# Patient Record
Sex: MALE | Race: BLACK OR AFRICAN AMERICAN | Employment: UNEMPLOYED | URBAN - METROPOLITAN AREA
[De-identification: names, ages, dates, MRNs, and addresses within clinical notes are randomized per-mention and may not be internally consistent; named-entity substitution may affect disease eponyms.]

---

## 2019-05-08 ENCOUNTER — HOSPITAL ENCOUNTER (EMERGENCY)
Age: 19
Discharge: HOME | End: 2019-05-08

## 2019-05-08 VITALS
HEART RATE: 100 BPM | WEIGHT: 180 LBS | OXYGEN SATURATION: 98 % | HEIGHT: 65 IN | DIASTOLIC BLOOD PRESSURE: 61 MMHG | TEMPERATURE: 98.2 F | RESPIRATION RATE: 16 BRPM | BODY MASS INDEX: 29.99 KG/M2 | SYSTOLIC BLOOD PRESSURE: 117 MMHG

## 2019-05-08 DIAGNOSIS — R11.2 NON-INTRACTABLE VOMITING WITH NAUSEA, UNSPECIFIED VOMITING TYPE: Primary | ICD-10-CM

## 2019-05-08 DIAGNOSIS — H10.9 CONJUNCTIVITIS OF RIGHT EYE, UNSPECIFIED CONJUNCTIVITIS TYPE: ICD-10-CM

## 2019-05-08 LAB
A/G RATIO: 1.3 (ref 1.1–2.2)
ALBUMIN SERPL-MCNC: 4.2 G/DL (ref 3.4–5)
ALP BLD-CCNC: 69 U/L (ref 40–129)
ALT SERPL-CCNC: <5 U/L (ref 10–40)
ANION GAP SERPL CALCULATED.3IONS-SCNC: 13 MMOL/L (ref 3–16)
AST SERPL-CCNC: <5 U/L (ref 15–37)
BASOPHILS ABSOLUTE: 0.1 K/UL (ref 0–0.2)
BASOPHILS RELATIVE PERCENT: 1.8 %
BILIRUB SERPL-MCNC: 0.4 MG/DL (ref 0–1)
BUN BLDV-MCNC: 10 MG/DL (ref 7–20)
CALCIUM SERPL-MCNC: 9.2 MG/DL (ref 8.3–10.6)
CHLORIDE BLD-SCNC: 102 MMOL/L (ref 99–110)
CO2: 24 MMOL/L (ref 21–32)
CREAT SERPL-MCNC: 1 MG/DL (ref 0.9–1.3)
EOSINOPHILS ABSOLUTE: 0.2 K/UL (ref 0–0.6)
EOSINOPHILS RELATIVE PERCENT: 3.5 %
GFR AFRICAN AMERICAN: >60
GFR NON-AFRICAN AMERICAN: >60
GLOBULIN: 3.3 G/DL
GLUCOSE BLD-MCNC: 140 MG/DL (ref 70–99)
HCT VFR BLD CALC: 43.9 % (ref 40.5–52.5)
HEMOGLOBIN: 15.9 G/DL (ref 13.5–17.5)
LIPASE: 29 U/L (ref 13–60)
LYMPHOCYTES ABSOLUTE: 1.8 K/UL (ref 1–5.1)
LYMPHOCYTES RELATIVE PERCENT: 31.2 %
MCH RBC QN AUTO: 31.5 PG (ref 26–34)
MCHC RBC AUTO-ENTMCNC: 36.1 G/DL (ref 31–36)
MCV RBC AUTO: 87.4 FL (ref 80–100)
MONOCYTES ABSOLUTE: 0.6 K/UL (ref 0–1.3)
MONOCYTES RELATIVE PERCENT: 10.3 %
NEUTROPHILS ABSOLUTE: 3 K/UL (ref 1.7–7.7)
NEUTROPHILS RELATIVE PERCENT: 53.2 %
PDW BLD-RTO: 13.6 % (ref 12.4–15.4)
PLATELET # BLD: 267 K/UL (ref 135–450)
PMV BLD AUTO: 8.8 FL (ref 5–10.5)
POTASSIUM SERPL-SCNC: 4.5 MMOL/L (ref 3.5–5.1)
RBC # BLD: 5.03 M/UL (ref 4.2–5.9)
SODIUM BLD-SCNC: 139 MMOL/L (ref 136–145)
TOTAL PROTEIN: 7.5 G/DL (ref 6.4–8.2)
WBC # BLD: 5.7 K/UL (ref 4–11)

## 2019-05-08 PROCEDURE — 83690 ASSAY OF LIPASE: CPT

## 2019-05-08 PROCEDURE — 6370000000 HC RX 637 (ALT 250 FOR IP): Performed by: PHYSICIAN ASSISTANT

## 2019-05-08 PROCEDURE — 99284 EMERGENCY DEPT VISIT MOD MDM: CPT

## 2019-05-08 PROCEDURE — 85025 COMPLETE CBC W/AUTO DIFF WBC: CPT

## 2019-05-08 PROCEDURE — 80053 COMPREHEN METABOLIC PANEL: CPT

## 2019-05-08 RX ORDER — ONDANSETRON 4 MG/1
4 TABLET, ORALLY DISINTEGRATING ORAL EVERY 8 HOURS PRN
Qty: 20 TABLET | Refills: 0 | Status: SHIPPED | OUTPATIENT
Start: 2019-05-08 | End: 2021-11-04

## 2019-05-08 RX ORDER — ONDANSETRON 4 MG/1
4 TABLET, ORALLY DISINTEGRATING ORAL ONCE
Status: COMPLETED | OUTPATIENT
Start: 2019-05-08 | End: 2019-05-08

## 2019-05-08 RX ORDER — ERYTHROMYCIN 5 MG/G
OINTMENT OPHTHALMIC
Qty: 1 TUBE | Refills: 0 | Status: SHIPPED | OUTPATIENT
Start: 2019-05-08 | End: 2019-05-18

## 2019-05-08 RX ADMIN — ONDANSETRON 4 MG: 4 TABLET, ORALLY DISINTEGRATING ORAL at 03:08

## 2019-05-08 SDOH — HEALTH STABILITY: MENTAL HEALTH: HOW OFTEN DO YOU HAVE A DRINK CONTAINING ALCOHOL?: NEVER

## 2019-05-08 ASSESSMENT — ENCOUNTER SYMPTOMS
EYE PAIN: 0
COUGH: 0
VOMITING: 1
DIARRHEA: 0
NAUSEA: 1
SHORTNESS OF BREATH: 0
EYE DISCHARGE: 1
ABDOMINAL PAIN: 0
EYE REDNESS: 1
RHINORRHEA: 0

## 2019-05-08 NOTE — ED NOTES
Pt without emesis during ER visit, tolerated L PO fluids well, vitals improved.      Babar Liao RN  05/08/19 0546

## 2019-05-08 NOTE — ED PROVIDER NOTES
**EVALUATED BY ADVANCED PRACTICE PROVIDER**        [address] ENCOUNTER      Pt Name: Barrett Bacon  BOM:[id number]  [place] 2000  Date of evaluation: 5/8/2019  Provider: Mikhail Matamoros PA-C      Chief Complaint:    Chief Complaint   Patient presents with    Emesis     emesis x2 hours    Eye Problem     concern for pink eye to R eye       Nursing Notes, Past Medical Hx, Past Surgical Hx, Social Hx, Allergies, and Family Hx were all reviewed and agreed with or any disagreements were addressed in the HPI.    HPI:  (Location, Duration, Timing, Severity,Quality, Assoc Sx, Context, Modifying factors)  This is a  22 y.o. male who presents to the emergency department today for evaluation for nausea and vomiting. The patient states that he has been feeling nauseous for the past several days, and he states that today he has had several episodes of emesis over the past 2 hours. He denies any bilious emesis, hematemesis or coffee-ground emesis. He has no abdominal pain. No diarrhea. No known sick contacts. He did eat [employer] prior to his emesis starting, and he states they did smoke marijuana earlier today. He denies any any new or different foods otherwise, he denies any fever or chills. No cough or congestion. No chest or shortness of breath. He denies any urinary symptoms. The patient states that for the past several days he has also noticed some redness to his right eye, and he is concern for pinkeye. He states he has had some eye drainage. He does not wear contacts. He denies any eye pain. No visual changes. No headaches    PastMedical/Surgical History:  History reviewed. No pertinent past medical history. History reviewed. No pertinent surgical history.     Medications:  Previous Medications    No medications on file         Review of Systems:  Review of Systems   Constitutional: Negative for activity change, appetite change, chills and fever.   HENT: Negative for congestion and rhinorrhea. Eyes: Positive for discharge and redness. Negative for pain and visual disturbance. Respiratory: Negative for cough and shortness of breath. Cardiovascular: Negative for chest pain. Gastrointestinal: Positive for nausea and vomiting. Negative for abdominal pain and diarrhea. Genitourinary: Negative for difficulty urinating, dysuria and hematuria. Neurological: Negative for headaches. Positives and Pertinent negatives as per HPI. Except as noted above in the ROS, problem specific ROS was completed and is negative. Physical Exam:  Physical Exam   Constitutional: He is oriented to person, place, and time. He appears well-developed and well-nourished. Well appearing in no acute distress, sitting comfortably in bed, talkative and interactive   HENT:   Head: Normocephalic and atraumatic. Right Ear: External ear normal.   Left Ear: External ear normal.   Nose: Nose normal.   Eyes: Pupils are equal, round, and reactive to light. EOM are normal. Right eye exhibits no discharge. Left eye exhibits no discharge. Conjunctival injection to the right eye, with discharge noted to the inner canthus. No pain with extraocular eye movements. There is no erythema, edema, warmth surrounding the orbit on the right. Neck: Normal range of motion. Neck supple. No tracheal deviation present. Cardiovascular: Normal rate, regular rhythm and normal heart sounds. No murmur heard. Pulmonary/Chest: Effort normal and breath sounds normal. No stridor. No respiratory distress. He has no wheezes. Abdominal: Soft. Bowel sounds are normal. He exhibits no distension. There is no tenderness. There is no guarding. Musculoskeletal: Normal range of motion. Neurological: He is alert and oriented to person, place, and time. Skin: Skin is warm and dry. He is not diaphoretic. Psychiatric: He has a normal mood and affect.  His behavior is normal.   Nursing note and vitals reviewed. MEDICAL DECISION MAKING    Vitals:    Vitals:    05/08/19 0247   BP: (!) 163/85   Pulse: 111   Resp: 16   Temp: 98.2 °F (36.8 °C)   SpO2: 97%   Weight: 180 lb (81.6 kg)   Height: 5' 5\" (1.651 m)       LABS:  Labs Reviewed   CBC WITH AUTO DIFFERENTIAL - Abnormal; Notable for the following components:       Result Value    MCHC 36.1 (*)     All other components within normal limits    Narrative:     Performed at:  [facility] [address] [facility],  [place], [address]   Phone [phone]   COMPREHENSIVE METABOLIC PANEL - Abnormal; Notable for the following components:    Glucose 140 (*)     All other components within normal limits    Narrative:     Performed at:  [facility]  [address] [facility],  [place], [address]   Phone [phone]   LIPASE    Narrative:     Performed at:  [facility] [address] [facility], [place]   Phone [phone] of labs reviewed and werenegative at this time or not returned at the time of this note. RADIOLOGY:   Non-plain film images such as CT, Ultrasound and MRI are read by the radiologist. Judy Ziegler PA-C have directly visualized the radiologic plain film image(s) with the below findings:        Interpretation per the Radiologist below, if available at the time of thisnote:    No orders to display        [facility] / ED COURSE:      PROCEDURES:   Procedures    None    Patient was given:  Medications   ondansetron (ZOFRAN-ODT) disintegrating tablet 4 mg (4 mg Oral Given 5/8/19 0308)     The patient presents to the emergency department today for evaluation for nausea and vomiting. The patient states that he has been feeling nauseous for the past several days, and he states that today he has had several episodes of emesis over the past 2 hours.   He denies any bilious emesis, hematemesis or coffee-ground

## 2019-05-08 NOTE — ED NOTES
Discharge instructions reviewed with pt, pt verbalized understanding of home medications.  PIV removed without complications, pt ambulatory to exit without difficulty     Gregory Tapia RN  05/08/19 0940

## 2019-05-08 NOTE — ED NOTES
Pt is alert and oriented x4, in bed with side rails up x2, wheels locked in lowest position with call light in reach, Redness noted to R eye, no drainage noted. Pt has easy, unlabored RR.  Not actively vomiting at this time    20 G IV placed to R [facility] on first attempt, pt tolerated well, labs obtained and sent     Cristine Lopez RN  05/08/19 0426 Irineo Chavira RN  05/08/19 5943

## 2021-11-04 ENCOUNTER — HOSPITAL ENCOUNTER (EMERGENCY)
Age: 21
Discharge: HOME | End: 2021-11-04
Payer: MEDICAID

## 2021-11-04 ENCOUNTER — APPOINTMENT (OUTPATIENT)
Dept: GENERAL RADIOLOGY | Age: 21
End: 2021-11-04
Payer: MEDICAID

## 2021-11-04 VITALS
TEMPERATURE: 97.2 F | OXYGEN SATURATION: 96 % | HEART RATE: 91 BPM | BODY MASS INDEX: 34.1 KG/M2 | SYSTOLIC BLOOD PRESSURE: 142 MMHG | RESPIRATION RATE: 16 BRPM | WEIGHT: 204.9 LBS | DIASTOLIC BLOOD PRESSURE: 85 MMHG

## 2021-11-04 DIAGNOSIS — J06.9 UPPER RESPIRATORY TRACT INFECTION, UNSPECIFIED TYPE: Primary | ICD-10-CM

## 2021-11-04 DIAGNOSIS — B97.89 SORE THROAT (VIRAL): ICD-10-CM

## 2021-11-04 DIAGNOSIS — J02.8 SORE THROAT (VIRAL): ICD-10-CM

## 2021-11-04 DIAGNOSIS — Z20.822 PERSON UNDER INVESTIGATION FOR COVID-19: ICD-10-CM

## 2021-11-04 LAB — S PYO AG THROAT QL: NEGATIVE

## 2021-11-04 PROCEDURE — 71046 X-RAY EXAM CHEST 2 VIEWS: CPT

## 2021-11-04 PROCEDURE — 87880 STREP A ASSAY W/OPTIC: CPT

## 2021-11-04 PROCEDURE — U0003 INFECTIOUS AGENT DETECTION BY NUCLEIC ACID (DNA OR RNA); SEVERE ACUTE RESPIRATORY SYNDROME CORONAVIRUS 2 (SARS-COV-2) (CORONAVIRUS DISEASE [COVID-19]), AMPLIFIED PROBE TECHNIQUE, MAKING USE OF HIGH THROUGHPUT TECHNOLOGIES AS DESCRIBED BY CMS-2020-01-R: HCPCS

## 2021-11-04 PROCEDURE — 6370000000 HC RX 637 (ALT 250 FOR IP): Performed by: PHYSICIAN ASSISTANT

## 2021-11-04 PROCEDURE — 99283 EMERGENCY DEPT VISIT LOW MDM: CPT

## 2021-11-04 PROCEDURE — U0005 INFEC AGEN DETEC AMPLI PROBE: HCPCS

## 2021-11-04 PROCEDURE — 87081 CULTURE SCREEN ONLY: CPT

## 2021-11-04 PROCEDURE — 6360000002 HC RX W HCPCS: Performed by: PHYSICIAN ASSISTANT

## 2021-11-04 RX ORDER — ACETAMINOPHEN 325 MG/1
650 TABLET ORAL ONCE
Status: COMPLETED | OUTPATIENT
Start: 2021-11-04 | End: 2021-11-04

## 2021-11-04 RX ORDER — NAPROXEN 500 MG/1
500 TABLET ORAL 2 TIMES DAILY PRN
Qty: 20 TABLET | Refills: 0 | Status: SHIPPED | OUTPATIENT
Start: 2021-11-04 | End: 2022-10-06

## 2021-11-04 RX ORDER — DEXAMETHASONE SODIUM PHOSPHATE 4 MG/ML
10 INJECTION, SOLUTION INTRA-ARTICULAR; INTRALESIONAL; INTRAMUSCULAR; INTRAVENOUS; SOFT TISSUE ONCE
Status: COMPLETED | OUTPATIENT
Start: 2021-11-04 | End: 2021-11-04

## 2021-11-04 RX ADMIN — ACETAMINOPHEN 650 MG: 325 TABLET ORAL at 10:22

## 2021-11-04 RX ADMIN — DEXAMETHASONE SODIUM PHOSPHATE 10 MG: 4 INJECTION, SOLUTION INTRAMUSCULAR; INTRAVENOUS at 10:22

## 2021-11-04 ASSESSMENT — ENCOUNTER SYMPTOMS
SHORTNESS OF BREATH: 0
VOICE CHANGE: 1
ABDOMINAL PAIN: 0
VOMITING: 0
NAUSEA: 0
COUGH: 1
CHEST TIGHTNESS: 0
SORE THROAT: 1
DIARRHEA: 0

## 2021-11-04 ASSESSMENT — PAIN SCALES - GENERAL: PAINLEVEL_OUTOF10: 8

## 2021-11-04 NOTE — ED PROVIDER NOTES
[facility]        Pt Name: Segundo Murillo  MRN: [id number]  [place] 2000  Date of evaluation: 11/4/2021  Provider: Marcelino Benjamin PA-C  PCP: No primary care provider on file. Note Started: 10:12 AM EDT       OCTAVIO. I have evaluated this patient. My supervising physician was available for consultation. CHIEF COMPLAINT       Chief Complaint   Patient presents with    Pharyngitis     Pt reports sore throat X3 days, no known ill contacts        HISTORY OF PRESENT ILLNESS   (Location, Timing/Onset, Context/Setting, Quality, Duration, Modifying Factors, Severity, Associated Signs and Symptoms)  Note limiting factors. Chief Complaint: Upper respiratory symptoms    Segundo Murillo is a 24 y.o. male who presents to the emergency department with a 3 to 4-day history of symptoms. Patient states he started having difficulties with nasal congestion, nonproductive cough, sore throat pain, ear fullness that is been ongoing now for approximately 3 to 4 days. Patient tells me the worst part seems to be his throat. He states he has not personally had COVID-19 infection has not been vaccinated with Covid and has potential exposures for Covid. He has not had any potential exposures to streptococcal pharyngitis that he is aware of. Patient states that he has had a mild loss of his voice. He is able to swallow eat and handle his secretions but does so with pain and discomfort. He has not had documented fevers or chills that he is aware of. His current level of pain and discomfort is 8 out of 10. He is found nothing to make the symptoms better and or worsen with this presents the ED for evaluation and treatment. Nursing Notes were all reviewed and agreed with or any disagreements were addressed in the HPI.     REVIEW OF SYSTEMS    (2-9 systems for level 4, 10 or more for level 5)     Review of Systems   Constitutional: Negative for activity change, chills and fever. HENT: Positive for congestion, sore throat and voice change. Negative for drooling. Respiratory: Positive for cough. Negative for chest tightness and shortness of breath. Cardiovascular: Negative for chest pain. Gastrointestinal: Negative for abdominal pain, diarrhea, nausea and vomiting. Genitourinary: Negative for dysuria and flank pain. All other systems reviewed and are negative. Positives and Pertinent negatives as per HPI. Except as noted above in the ROS, all other systems were reviewed and negative. PAST MEDICAL HISTORY   History reviewed. No pertinent past medical history. SURGICAL HISTORY   History reviewed. No pertinent surgical history. [address]       Discharge Medication List as of 11/4/2021 10:55 AM            ALLERGIES     Patient has no known allergies. FAMILYHISTORY     History reviewed. No pertinent family history. SOCIAL HISTORY       Social History     Tobacco Use    Smoking status: Never Smoker    Smokeless tobacco: Never Used   Substance Use Topics    Alcohol use: Never    Drug use: Yes     Types: Marijuana (Weed)       SCREENINGS             PHYSICAL EXAM    (up to 7 for level 4, 8 or more for level 5)     ED Triage Vitals [11/04/21 1005]   BP Temp Temp src Pulse Resp SpO2 Height Weight   (!) 142/85 97.2 °F (36.2 °C) -- 100 16 100 % -- 204 lb 14.4 oz (92.9 kg)       Physical Exam  Vitals and nursing note reviewed. Constitutional:       General: He is awake. He is not in acute distress. Appearance: Normal appearance. He is well-developed. He is obese. He is not ill-appearing, toxic-appearing or diaphoretic. HENT:      Head: Normocephalic and atraumatic. No raccoon eyes, Dooley's sign or laceration. Right Ear: Hearing and external ear normal.      Left Ear: Hearing and external ear normal.      Nose: Nose normal.      Mouth/Throat:      Pharynx: Posterior oropharyngeal erythema present.  No uvula swelling. Tonsils: No tonsillar exudate or tonsillar abscesses. 2+ on the right. 2+ on the left. Comments: Equal and symmetric tonsillar enlargement as well as erythema. No rajwinder exudate. No peritonsillar abscess no retropharyngeal abscess no trismus. Normal voice appreciated. Eyes:      General: No scleral icterus. Right eye: No discharge. Left eye: No discharge. Conjunctiva/sclera: Conjunctivae normal.   Neck:      Vascular: No JVD. Cardiovascular:      Rate and Rhythm: Normal rate and regular rhythm. Heart sounds: No murmur heard. No friction rub. No gallop. Pulmonary:      Effort: Pulmonary effort is normal. No accessory muscle usage or respiratory distress. Breath sounds: Normal breath sounds. No wheezing, rhonchi or rales. Musculoskeletal:      Cervical back: Normal range of motion. Skin:     General: Skin is warm and dry. Neurological:      Mental Status: He is alert and oriented to person, place, and time. GCS: GCS eye subscore is 4. GCS verbal subscore is 5. GCS motor subscore is 6. Cranial Nerves: No cranial nerve deficit. Sensory: No sensory deficit. Coordination: Coordination normal.   Psychiatric:         Behavior: Behavior normal. Behavior is cooperative. DIAGNOSTIC RESULTS   LABS:    Labs Reviewed   STREP SCREEN GROUP A THROAT    Narrative:     Performed at:  [facility] [address], [place], [facility] [address]   Phone [phone]   CULTURE, [address]   [facility]       When ordered only abnormal lab results are displayed. All other labs were within normal range or not returned as of this dictation. EKG: When ordered, EKG's are interpreted by the Emergency Department Physician in the absence of a cardiologist.  Please see their note for interpretation of EKG.     RADIOLOGY:   Non-plain film images such as CT, Ultrasound and MRI are read by the radiologist. Angelic Hough radiographic images are visualized and preliminarily interpreted by the ED Provider with the below findings:        Interpretation per the Radiologist below, if available at the time of this note:    XR CHEST (2 VW)   Final Result   Negative chest x-ray. No evidence of pneumonia. PROCEDURES   Unless otherwise noted below, none     Procedures    CRITICAL CARE TIME   N/A    CONSULTS:  None      EMERGENCY DEPARTMENT COURSE and DIFFERENTIAL DIAGNOSIS/MDM:   Vitals:    Vitals:    11/04/21 1005 11/04/21 1055   BP: (!) 142/85    Pulse: 100 91   Resp: 16    Temp: 97.2 °F (36.2 °C)    SpO2: 100% 96%   Weight: 204 lb 14.4 oz (92.9 kg)        Patient was given the following medications:  Medications   Dexamethasone Sodium Phosphate injection 10 mg (10 mg Oral Given 11/4/21 1022)   acetaminophen (TYLENOL) tablet 650 mg (650 mg Oral Given 11/4/21 1022)           The patient's detailed history of present illness is documented as above. Upon arrival to the emergency department the patient's vital signs are as documented. The patient is noted to be hemodynamically stable and afebrile. Physical examination findings are as above. Symptomatology was treated as above. Rapid strep screening is negative. Covid 19 PCR has been sent. Chest x-ray demonstrates no evidence of acute cardiopulmonary process. I discussed the above-mentioned with the patient in detail. Ongoing care management on outpatient basis. Isolation. Supportive care. Strict potential instructions for return. The patient has been made aware of the signs and symptoms which would necessitate an immediate return to the emergency department and verbalizes an understanding of these signs and symptoms. The presentation is consistent with an upper respiratory infection and the exact etiology of this is not currently noted. . This may be a result of viral causes including COVID-19.   There is no current evidence to suggest sepsis, meningitis, epiglottitis, pneumonia, acute bacterial sinusitis, streptococcal pharyngitis, otitis media, or other bacterial infection that would be managed with antibiotics. The patient is tolerating by mouth without difficulty and is in evidence of acute distress on examination. Breath sounds are clear to ascultation. Vital signs are unremarkable for significant abnormality. Supportive care recommended at this time and the patient can be managed on an outpatient basis with follow-up with their primary care provider. Strict return precautions given for changing or worsening pain, trouble swallowing or breating, neck stiffness, fever, or rash. FINAL IMPRESSION      1. Upper respiratory tract infection, unspecified type    2. Sore throat (viral)    3.  Person under investigation for COVID-19          DISPOSITION/PLAN   DISPOSITION: Discharged home      PATIENT REFERRED TO:  Cori Esteban  Emergency Department  [address] [place] [address]  [phone]    If symptoms worsen      DISCHARGE MEDICATIONS:  Discharge Medication List as of 11/4/2021 10:55 AM      START taking these medications    Details   naproxen (NAPROSYN) 500 MG tablet Take 1 tablet by mouth 2 times daily as needed for Pain, Disp-20 tablet, R-0Print             DISCONTINUED MEDICATIONS:  Discharge Medication List as of 11/4/2021 10:55 AM      STOP taking these medications       ondansetron (ZOFRAN ODT) 4 MG disintegrating tablet Comments:   Reason for Stopping:                      (Please note that portions of this note were completed with a voice recognition program.  Efforts were made to edit the dictations but occasionally words are mis-transcribed.)    Ryan Gold PA-C (electronically signed)           Renetta Murillo PA-C  11/04/21 5736

## 2021-11-05 LAB — SARS-COV-2: NOT DETECTED

## 2021-11-06 LAB — S PYO THROAT QL CULT: NORMAL

## 2022-10-06 ENCOUNTER — HOSPITAL ENCOUNTER (EMERGENCY)
Age: 22
Discharge: HOME | DRG: 723 | End: 2022-10-06
Attending: EMERGENCY MEDICINE
Payer: MEDICAID

## 2022-10-06 ENCOUNTER — APPOINTMENT (OUTPATIENT)
Dept: CT IMAGING | Age: 22
DRG: 723 | End: 2022-10-06
Payer: MEDICAID

## 2022-10-06 VITALS
RESPIRATION RATE: 16 BRPM | HEIGHT: 65 IN | SYSTOLIC BLOOD PRESSURE: 173 MMHG | HEART RATE: 95 BPM | TEMPERATURE: 101.4 F | BODY MASS INDEX: 34.67 KG/M2 | OXYGEN SATURATION: 98 % | WEIGHT: 208.1 LBS | DIASTOLIC BLOOD PRESSURE: 100 MMHG

## 2022-10-06 DIAGNOSIS — B27.90 INFECTIOUS MONONUCLEOSIS WITHOUT COMPLICATION, INFECTIOUS MONONUCLEOSIS DUE TO UNSPECIFIED ORGANISM: Primary | ICD-10-CM

## 2022-10-06 LAB
ANION GAP SERPL CALCULATED.3IONS-SCNC: 12 MMOL/L (ref 3–16)
BASOPHILS ABSOLUTE: 0 K/UL (ref 0–0.2)
BASOPHILS RELATIVE PERCENT: 0.1 %
BUN BLDV-MCNC: 10 MG/DL (ref 7–20)
CALCIUM SERPL-MCNC: 9.2 MG/DL (ref 8.3–10.6)
CHLORIDE BLD-SCNC: 102 MMOL/L (ref 99–110)
CO2: 23 MMOL/L (ref 21–32)
CREAT SERPL-MCNC: 1.2 MG/DL (ref 0.9–1.3)
EOSINOPHILS ABSOLUTE: 0.1 K/UL (ref 0–0.6)
EOSINOPHILS RELATIVE PERCENT: 0.6 %
GFR AFRICAN AMERICAN: >60
GFR NON-AFRICAN AMERICAN: >60
GLUCOSE BLD-MCNC: 107 MG/DL (ref 70–99)
HCT VFR BLD CALC: 42 % (ref 40.5–52.5)
HEMOGLOBIN: 14.4 G/DL (ref 13.5–17.5)
LYMPHOCYTES ABSOLUTE: 4.7 K/UL (ref 1–5.1)
LYMPHOCYTES RELATIVE PERCENT: 35.3 %
MCH RBC QN AUTO: 28.9 PG (ref 26–34)
MCHC RBC AUTO-ENTMCNC: 34.3 G/DL (ref 31–36)
MCV RBC AUTO: 84.3 FL (ref 80–100)
MONO TEST: POSITIVE
MONOCYTES ABSOLUTE: 1.5 K/UL (ref 0–1.3)
MONOCYTES RELATIVE PERCENT: 11.3 %
NEUTROPHILS ABSOLUTE: 7.1 K/UL (ref 1.7–7.7)
NEUTROPHILS RELATIVE PERCENT: 52.7 %
PDW BLD-RTO: 13.5 % (ref 12.4–15.4)
PLATELET # BLD: 231 K/UL (ref 135–450)
PMV BLD AUTO: 8.5 FL (ref 5–10.5)
POTASSIUM SERPL-SCNC: 3.3 MMOL/L (ref 3.5–5.1)
RBC # BLD: 4.99 M/UL (ref 4.2–5.9)
S PYO AG THROAT QL: NEGATIVE
SODIUM BLD-SCNC: 137 MMOL/L (ref 136–145)
WBC # BLD: 13.4 K/UL (ref 4–11)

## 2022-10-06 PROCEDURE — 80048 BASIC METABOLIC PNL TOTAL CA: CPT

## 2022-10-06 PROCEDURE — 96375 TX/PRO/DX INJ NEW DRUG ADDON: CPT

## 2022-10-06 PROCEDURE — 2580000003 HC RX 258: Performed by: PHYSICIAN ASSISTANT

## 2022-10-06 PROCEDURE — 99285 EMERGENCY DEPT VISIT HI MDM: CPT

## 2022-10-06 PROCEDURE — 6360000004 HC RX CONTRAST MEDICATION: Performed by: PHYSICIAN ASSISTANT

## 2022-10-06 PROCEDURE — 86308 HETEROPHILE ANTIBODY SCREEN: CPT

## 2022-10-06 PROCEDURE — 96361 HYDRATE IV INFUSION ADD-ON: CPT

## 2022-10-06 PROCEDURE — 96374 THER/PROPH/DIAG INJ IV PUSH: CPT

## 2022-10-06 PROCEDURE — 6360000002 HC RX W HCPCS: Performed by: PHYSICIAN ASSISTANT

## 2022-10-06 PROCEDURE — 87081 CULTURE SCREEN ONLY: CPT

## 2022-10-06 PROCEDURE — 85025 COMPLETE CBC W/AUTO DIFF WBC: CPT

## 2022-10-06 PROCEDURE — 87880 STREP A ASSAY W/OPTIC: CPT

## 2022-10-06 PROCEDURE — 70491 CT SOFT TISSUE NECK W/DYE: CPT

## 2022-10-06 RX ORDER — KETOROLAC TROMETHAMINE 30 MG/ML
30 INJECTION, SOLUTION INTRAMUSCULAR; INTRAVENOUS ONCE
Status: COMPLETED | OUTPATIENT
Start: 2022-10-06 | End: 2022-10-06

## 2022-10-06 RX ORDER — ONDANSETRON 2 MG/ML
4 INJECTION INTRAMUSCULAR; INTRAVENOUS EVERY 6 HOURS PRN
Status: DISCONTINUED | OUTPATIENT
Start: 2022-10-06 | End: 2022-10-07 | Stop reason: HOSPADM

## 2022-10-06 RX ORDER — 0.9 % SODIUM CHLORIDE 0.9 %
1000 INTRAVENOUS SOLUTION INTRAVENOUS ONCE
Status: COMPLETED | OUTPATIENT
Start: 2022-10-06 | End: 2022-10-06

## 2022-10-06 RX ORDER — NAPROXEN 500 MG/1
500 TABLET ORAL 2 TIMES DAILY WITH MEALS
Qty: 20 TABLET | Refills: 0 | Status: SHIPPED | OUTPATIENT
Start: 2022-10-06 | End: 2022-10-16

## 2022-10-06 RX ORDER — DEXAMETHASONE SODIUM PHOSPHATE 10 MG/ML
10 INJECTION, SOLUTION INTRAMUSCULAR; INTRAVENOUS ONCE
Status: COMPLETED | OUTPATIENT
Start: 2022-10-06 | End: 2022-10-06

## 2022-10-06 RX ADMIN — KETOROLAC TROMETHAMINE 30 MG: 30 INJECTION, SOLUTION INTRAMUSCULAR at 19:13

## 2022-10-06 RX ADMIN — IOPAMIDOL 75 ML: 755 INJECTION, SOLUTION INTRAVENOUS at 19:48

## 2022-10-06 RX ADMIN — SODIUM CHLORIDE 1000 ML: 9 INJECTION, SOLUTION INTRAVENOUS at 19:17

## 2022-10-06 RX ADMIN — ONDANSETRON 4 MG: 2 INJECTION INTRAMUSCULAR; INTRAVENOUS at 19:14

## 2022-10-06 RX ADMIN — DEXAMETHASONE SODIUM PHOSPHATE 10 MG: 10 INJECTION, SOLUTION INTRAMUSCULAR; INTRAVENOUS at 19:14

## 2022-10-06 ASSESSMENT — PAIN - FUNCTIONAL ASSESSMENT: PAIN_FUNCTIONAL_ASSESSMENT: 0-10

## 2022-10-06 ASSESSMENT — ENCOUNTER SYMPTOMS
ABDOMINAL PAIN: 0
COUGH: 0
TROUBLE SWALLOWING: 0
NAUSEA: 0
SHORTNESS OF BREATH: 0
RHINORRHEA: 0
SORE THROAT: 1
DIARRHEA: 0
VOMITING: 0
VOICE CHANGE: 1

## 2022-10-06 ASSESSMENT — PAIN SCALES - GENERAL: PAINLEVEL_OUTOF10: 10

## 2022-10-06 NOTE — Clinical Note
Carmela Dozier was seen and treated in our emergency department on 10/6/2022. He may return to work on 10/10/2022. If you have any questions or concerns, please don't hesitate to call.       Kari Berkowitz, DO

## 2022-10-06 NOTE — Clinical Note
Beverley Santamaria was seen and treated in our emergency department on 10/6/2022. He may return to work on 10/10/2022. If you have any questions or concerns, please don't hesitate to call.       Machelle Berkowitz, DO

## 2022-10-06 NOTE — ED PROVIDER NOTES
[facility]        Pt Name: Luis Armando Reyes  MRN: [id number]  [place] 2000  Date of evaluation: 10/6/2022  Provider: Jose Raul Deutsch PA-C  PCP: No primary care provider on file. Note Started: 7:17 PM EDT        I have seen and evaluated this patient with my supervising physician No att. providers found. CHIEF COMPLAINT       Chief Complaint   Patient presents with    Pharyngitis     10/10 throat pain x 2 days. Denies any other symptoms at this time. HISTORY OF PRESENT ILLNESS   (Location, Timing/Onset, Context/Setting, Quality, Duration, Modifying Factors, Severity, Associated Signs and Symptoms)  Note limiting factors. Chief Complaint: Sore throat    Luis Armando Reyes is a 24 y.o. male who presents to the emergency department today for evaluation for a sore throat, fever, headache, and voice change. Patient states his symptoms have been ongoing for the past 2 days. Patient has had fevers as high as 101 at home, patient is febrile here 101.4. Patient has not taken any medications today. He states that he has had worsening sore throat, he states he is able to tolerate his secretions however he states that he can barely talk due to the pain, and swelling in his throat. He is rating his pain as a 10/10, his pain is constant. He does report painful swallowing. Denies drooling. Patient denies any cough or congestion. He denies any abdominal pain, nausea, vomiting or diarrhea. No urinary symptoms, no other complaints. Nursing Notes were all reviewed and agreed with or any disagreements were addressed in the HPI. REVIEW OF SYSTEMS    (2-9 systems for level 4, 10 or more for level 5)     Review of Systems   Constitutional:  Positive for fever. Negative for activity change, appetite change and chills. HENT:  Positive for sore throat and voice change. Negative for congestion, rhinorrhea and trouble swallowing. Respiratory:  Negative for cough and shortness of breath. Cardiovascular:  Negative for chest pain. Gastrointestinal:  Negative for abdominal pain, diarrhea, nausea and vomiting. Genitourinary:  Negative for difficulty urinating, dysuria and hematuria. Positives and Pertinent negatives as per HPI. Except as noted above in the ROS, all other systems were reviewed and negative. PAST MEDICAL HISTORY   History reviewed. No pertinent past medical history. SURGICAL HISTORY   History reviewed. No pertinent surgical history. [address]       Discharge Medication List as of 10/6/2022 10:36 PM            ALLERGIES     Patient has no known allergies. FAMILYHISTORY     History reviewed. No pertinent family history. SOCIAL HISTORY       Social History     Tobacco Use    Smoking status: Never    Smokeless tobacco: Never   Substance Use Topics    Alcohol use: Never    Drug use: Yes     Types: Marijuana (Weed)       SCREENINGS             PHYSICAL EXAM    (up to 7 for level 4, 8 or more for level 5)     ED Triage Vitals [10/06/22 1844]   BP Temp Temp Source Heart Rate Resp SpO2 Height Weight   (!) 173/100 (!) 101.4 °F (38.6 °C) Oral (!) 119 16 98 % 5' 5\" (1.651 m) 208 lb 1.6 oz (94.4 kg)       Physical Exam  Vitals and nursing note reviewed. Constitutional:       Appearance: He is well-developed. He is not diaphoretic. Comments: Mildly ill-appearing, although nontoxic. HENT:      Head: Normocephalic and atraumatic. Right Ear: External ear normal.      Left Ear: External ear normal.      Nose: Nose normal.      Mouth/Throat:      Mouth: Mucous membranes are moist.      Pharynx: Oropharyngeal exudate and posterior oropharyngeal erythema present. Tonsils: Tonsillar exudate present. 4+ on the right. 4+ on the left. Comments: Posterior pharynx is erythematous. Tonsils are 4+, symmetrical with exudate. Uvula is midline.   Patient is able to open his mouth more than 2 fingers with, however does seem to have a slight hot potato voice noted. Eyes:      General:         Right eye: No discharge. Left eye: No discharge. Neck:      Trachea: No tracheal deviation. Cardiovascular:      Rate and Rhythm: Regular rhythm. Tachycardia present. Pulmonary:      Effort: Pulmonary effort is normal. No respiratory distress. Breath sounds: Normal breath sounds. No wheezing or rales. Abdominal:      General: Bowel sounds are normal. There is no distension. Palpations: Abdomen is soft. Tenderness: There is no abdominal tenderness. There is no guarding or rebound. Musculoskeletal:         General: Normal range of motion. Cervical back: Normal range of motion and neck supple. Lymphadenopathy:      Cervical: Cervical adenopathy present. Skin:     General: Skin is warm and dry. Neurological:      Mental Status: He is alert and oriented to person, place, and time. Psychiatric:         Behavior: Behavior normal.       DIAGNOSTIC RESULTS   LABS:    Labs Reviewed   CBC WITH AUTO DIFFERENTIAL - Abnormal; Notable for the following components:       Result Value    WBC 13.4 (*)     Monocytes Absolute 1.5 (*)     All other components within normal limits   BASIC METABOLIC PANEL - Abnormal; Notable for the following components:    Potassium 3.3 (*)     Glucose 107 (*)     All other components within normal limits   MONONUCLEOSIS SCREEN - Abnormal; Notable for the following components:    Mono Test POSITIVE (*)     All other components within normal limits   STREP SCREEN GROUP A THROAT   CULTURE, BETA STREP CONFIRM PLATES       When ordered only abnormal lab results are displayed. All other labs were within normal range or not returned as of this dictation. EKG: When ordered, EKG's are interpreted by the Emergency Department Physician in the absence of a cardiologist.  Please see their note for interpretation of EKG.     RADIOLOGY:   Non-plain film images such as CT, Ultrasound and MRI are read by the radiologist. Plain radiographic images are visualized and preliminarily interpreted by the ED Provider with the below findings:        Interpretation per the Radiologist below, if available at the time of this note:    CT SOFT TISSUE NECK W CONTRAST   Final Result   Tonsillitis resulting in narrowing of the nasopharyngeal airway. No evidence   of abscess. Bilateral cervical lymphadenopathy, likely reactive. No results found. PROCEDURES   Unless otherwise noted below, none     Procedures    CRITICAL CARE TIME       CONSULTS:  None      EMERGENCY DEPARTMENT COURSE and DIFFERENTIAL DIAGNOSIS/MDM:   Vitals:    Vitals:    10/06/22 1844 10/06/22 2230   BP: (!) 173/100    Pulse: (!) 119 95   Resp: 16    Temp: (!) 101.4 °F (38.6 °C)    TempSrc: Oral    SpO2: 98%    Weight: 208 lb 1.6 oz (94.4 kg)    Height: 5' 5\" (1.651 m)        Patient was given the following medications:  Medications   ondansetron (ZOFRAN) injection 4 mg (4 mg IntraVENous Given 10/6/22 1914)   0.9 % sodium chloride bolus (0 mLs IntraVENous Stopped 10/6/22 2030)   dexamethasone (PF) (DECADRON) injection 10 mg (10 mg IntraVENous Given 10/6/22 1914)   ketorolac (TORADOL) injection 30 mg (30 mg IntraVENous Given 10/6/22 1913)   iopamidol (ISOVUE-370) 76 % injection 75 mL (75 mLs IntraVENous Given 10/6/22 1948)     ED Course as of 10/06/22 2347   Thu Oct 06, 2022   2223 HR 95 bpm [AJIT]      ED Course User Index  [AJIT] Dave Lozadacarlo Berkowitz, DO      Is this patient to be included in the SEP-1 Core Measure due to severe sepsis or septic shock? No   Exclusion criteria - the patient is NOT to be included for SEP-1 Core Measure due to:  Viral etiology found or highly suspected (including COVID-19) without concomitant bacterial infection    Briefly, this is a 44-year-old male who presents to the emergency department today for fever, headache, sore throat x2 days.     On examination he is tachycardic on exam, likely due to his acute febrile state, he is mildly ill-appearing although nontoxic. Tonsils are 4+, symmetrical with exudate. Uvula is midline, however there is edema noted to the posterior oropharynx. Patient does have a slight hot potato voice noted with tender anterior cervical lymphadenopathy. Strep is negative. CBC shows a leukocytosis of 13.4, BMP is relatively unremarkable, monotest was positive. CT soft tissue neck shows tonsillitis resulting in narrowing of the nasopharyngeal airway. There is no abscess. Patient was given Toradol, Zofran, Decadron in the ED, and upon reevaluation he states that he is feeling much better. The phonation, seems to have resolved, voice seems to be back to normal.  Patient is easily much better and does feel comfortable going home, admission was discussed however patient is declining, and states that he would prefer to go home, I do feel that this is reasonable, with strict return precautions given. He is to return to the ED for any new or worsening symptoms, patient voiced understanding agreeable plan. Stable for discharge.    Results for orders placed or performed during the hospital encounter of 10/06/22   Strep Screen Group A Throat    Specimen: Throat   Result Value Ref Range    Rapid Strep A Screen Negative Negative   CBC with Auto Differential   Result Value Ref Range    WBC 13.4 (H) 4.0 - 11.0 K/uL    RBC 4.99 4.20 - 5.90 M/uL    Hemoglobin 14.4 13.5 - 17.5 g/dL    Hematocrit 42.0 40.5 - 52.5 %    MCV 84.3 80.0 - 100.0 fL    MCH 28.9 26.0 - 34.0 pg    MCHC 34.3 31.0 - 36.0 g/dL    RDW 13.5 12.4 - 15.4 %    Platelets 153 109 - 934 K/uL    MPV 8.5 5.0 - 10.5 fL    Neutrophils % 52.7 %    Lymphocytes % 35.3 %    Monocytes % 11.3 %    Eosinophils % 0.6 %    Basophils % 0.1 %    Neutrophils Absolute 7.1 1.7 - 7.7 K/uL    Lymphocytes Absolute 4.7 1.0 - 5.1 K/uL    Monocytes Absolute 1.5 (H) 0.0 - 1.3 K/uL    Eosinophils Absolute 0.1 0.0 - 0.6 K/uL    Basophils Absolute 0.0 0.0 - 0.2 K/uL   Basic Metabolic Panel   Result Value Ref Range    Sodium 137 136 - 145 mmol/L    Potassium 3.3 (L) 3.5 - 5.1 mmol/L    Chloride 102 99 - 110 mmol/L    CO2 23 21 - 32 mmol/L    Anion Gap 12 3 - 16    Glucose 107 (H) 70 - 99 mg/dL    BUN 10 7 - 20 mg/dL    Creatinine 1.2 0.9 - 1.3 mg/dL    GFR Non-African American >60 >60    GFR African American >60 >60    Calcium 9.2 8.3 - 10.6 mg/dL   Mononucleosis screen   Result Value Ref Range    Mono Test POSITIVE (A) Negative       I estimate there is LOW risk for COMPARTMENT SYNDROME, DEEP VENOUS THROMBOSIS, SEPTIC ARTHRITIS, TENDON OR NEUROVASCULAR INJURY, thus I consider the discharge disposition reasonable. [facility] and I have discussed the diagnosis and risks, and we agree with discharging home to follow-up with their primary doctor or the referral orthopedist. We also discussed returning to the Emergency Department immediately if new or worsening symptoms occur. We have discussed the symptoms which are most concerning (e.g., changing or worsening pain, numbness, weakness) that necessitate immediate return. FINAL IMPRESSION      1. Infectious mononucleosis without complication, infectious mononucleosis due to unspecified organism          DISPOSITION/PLAN   DISPOSITION Decision To Discharge 10/06/2022 10:24:41 PM      PATIENT REFERRED TO:  No follow-up provider specified.     DISCHARGE MEDICATIONS:  Discharge Medication List as of 10/6/2022 10:36 PM          DISCONTINUED MEDICATIONS:  Discharge Medication List as of 10/6/2022 10:36 PM                 (Please note that portions of this note were completed with a voice recognition program.  Efforts were made to edit the dictations but occasionally words are mis-transcribed.)    Rhonda Caballero PA-C (electronically signed)              Rhonda Caballero PA-C  10/06/22 9043

## 2022-10-07 NOTE — DISCHARGE INSTRUCTIONS
Please follow-up your primary care physician within the next 2 days for reevaluation of your symptoms. Drink plenty of fluids. Begin taking naproxen as needed for throat pain, you may additionally combine this with Tylenol taken over-the-counter. Return to the emergency part immediately if you have worsening symptoms, trouble breathing, severe voice changes, drooling, wheezing, chest pain or severe vomiting. Return if you have other new or changing symptoms that concern you and you wish to be reevaluated.

## 2022-10-07 NOTE — ED PROVIDER NOTES
In addition to the advanced practice provider, I personally saw [facility] and performed a substantive portion of the visit including all aspects of the medical decision making. Briefly, this is a 24 y.o. male here for throat pain gradually worsening over the past 2 days. Associated with fevers. .    On exam, patient febrile however nontoxic. No distress. Speech is slightly muffled, however tolerating his oral secretions. Abdomen soft, nondistended, nontender to deep palpation all quadrants. No palpable masses. There is tonsillar edema with exudate, uvula midline, no evidence of abscess. Heart RRR. Lungs CTAB. There is pronounced anterior cervical as well as posterior auricular lymphadenopathy. MDM    Patient febrile however nontoxic. He does not appear systemically ill. No hypoxia or increased work of breathing. At time of my evaluation he has received Decadron in the emergency department and reports feeling much improved. Speech is minimally muffled, he is tolerating his oral secretions and denies worsening symptoms with positional changes. Laboratory work-up consistent with mononucleosis. Abdomen is soft and nontender, no palpable masses. His initial tachycardia was resolved at time of my evaluation. I had lengthy discussion with patient and his girlfriend (with patient's parents) and engaged in shared decision-making regarding further observation the emergency department versus hospital admission versus discharge to home. Patient states he is feeling much improved and does not wish to be hospitalized, he feels comfortable returning to home and states he is able to return easily should his symptoms change or worsen. Patient's girlfriend will stay with him and will be able to watch over him. Will discharge patient to self-care as per his wishes. Strict immediate return precautions were discussed in detail. I Dr. Arlyn Reym am the primary clinician of record.         Patient Referrals:  No follow-up provider specified. Discharge Medications:  Discharge Medication List as of 10/6/2022 10:36 PM          FINAL IMPRESSION  1. Infectious mononucleosis without complication, infectious mononucleosis due to unspecified organism        Blood pressure (!) 173/100, pulse 95, temperature (!) 101.4 °F (38.6 °C), temperature source Oral, resp. rate 16, height 5' 5\" (1.651 m), weight 208 lb 1.6 oz (94.4 kg), SpO2 98 %. For further details of [facility] emergency department encounter, please see documentation by advanced practice provider, KASSY Contreras.     Nhi Worley DO (electronically signed)  Attending Emergency Physician       Nhi Worley DO  10/07/22 3394

## 2022-10-08 ENCOUNTER — APPOINTMENT (OUTPATIENT)
Dept: GENERAL RADIOLOGY | Age: 22
DRG: 723 | End: 2022-10-08
Payer: MEDICAID

## 2022-10-08 ENCOUNTER — HOSPITAL ENCOUNTER (INPATIENT)
Age: 22
LOS: 1 days | Discharge: HOME | DRG: 723 | End: 2022-10-10
Attending: EMERGENCY MEDICINE
Payer: MEDICAID

## 2022-10-08 ENCOUNTER — APPOINTMENT (OUTPATIENT)
Dept: CT IMAGING | Age: 22
DRG: 723 | End: 2022-10-08
Payer: MEDICAID

## 2022-10-08 DIAGNOSIS — J02.9 ACUTE PHARYNGITIS, UNSPECIFIED ETIOLOGY: Primary | ICD-10-CM

## 2022-10-08 DIAGNOSIS — A41.9 SEPTICEMIA (HCC): ICD-10-CM

## 2022-10-08 DIAGNOSIS — R06.1 STRIDOR: ICD-10-CM

## 2022-10-08 PROBLEM — B27.90 ACUTE PHARYNGITIS DUE TO INFECTIOUS MONONUCLEOSIS: Status: ACTIVE | Noted: 2022-10-08

## 2022-10-08 LAB
A/G RATIO: 0.9 (ref 1.1–2.2)
ALBUMIN SERPL-MCNC: 3.9 G/DL (ref 3.4–5)
ALP BLD-CCNC: 71 U/L (ref 40–129)
ALT SERPL-CCNC: 66 U/L (ref 10–40)
ANION GAP SERPL CALCULATED.3IONS-SCNC: 12 MMOL/L (ref 3–16)
AST SERPL-CCNC: 58 U/L (ref 15–37)
BANDED NEUTROPHILS RELATIVE PERCENT: 12 % (ref 0–7)
BASOPHILS ABSOLUTE: 0 K/UL (ref 0–0.2)
BASOPHILS RELATIVE PERCENT: 0 %
BILIRUB SERPL-MCNC: 1.2 MG/DL (ref 0–1)
BUN BLDV-MCNC: 11 MG/DL (ref 7–20)
CALCIUM SERPL-MCNC: 8.7 MG/DL (ref 8.3–10.6)
CHLORIDE BLD-SCNC: 100 MMOL/L (ref 99–110)
CO2: 23 MMOL/L (ref 21–32)
CREAT SERPL-MCNC: 0.9 MG/DL (ref 0.9–1.3)
EOSINOPHILS ABSOLUTE: 0 K/UL (ref 0–0.6)
EOSINOPHILS RELATIVE PERCENT: 0 %
GFR AFRICAN AMERICAN: >60
GFR NON-AFRICAN AMERICAN: >60
GLUCOSE BLD-MCNC: 146 MG/DL (ref 70–99)
HCT VFR BLD CALC: 39.7 % (ref 40.5–52.5)
HEMATOLOGY PATH CONSULT: YES
HEMOGLOBIN: 13.8 G/DL (ref 13.5–17.5)
LACTIC ACID, SEPSIS: 1.5 MMOL/L (ref 0.4–1.9)
LYMPHOCYTES ABSOLUTE: 3.6 K/UL (ref 1–5.1)
LYMPHOCYTES RELATIVE PERCENT: 30 %
MCH RBC QN AUTO: 29.1 PG (ref 26–34)
MCHC RBC AUTO-ENTMCNC: 34.9 G/DL (ref 31–36)
MCV RBC AUTO: 83.5 FL (ref 80–100)
MONOCYTES ABSOLUTE: 2 K/UL (ref 0–1.3)
MONOCYTES RELATIVE PERCENT: 17 %
MONONUCLEAR UNIDENTIFIED CELLS: 3 %
NEUTROPHILS ABSOLUTE: 6 K/UL (ref 1.7–7.7)
NEUTROPHILS RELATIVE PERCENT: 38 %
PDW BLD-RTO: 13.7 % (ref 12.4–15.4)
PLATELET # BLD: 218 K/UL (ref 135–450)
PMV BLD AUTO: 8.6 FL (ref 5–10.5)
POTASSIUM SERPL-SCNC: 3.1 MMOL/L (ref 3.5–5.1)
PROCALCITONIN: 0.15 NG/ML (ref 0–0.15)
RBC # BLD: 4.75 M/UL (ref 4.2–5.9)
S PYO THROAT QL CULT: NORMAL
SODIUM BLD-SCNC: 135 MMOL/L (ref 136–145)
TOTAL PROTEIN: 8.4 G/DL (ref 6.4–8.2)
WBC # BLD: 12 K/UL (ref 4–11)

## 2022-10-08 PROCEDURE — 96375 TX/PRO/DX INJ NEW DRUG ADDON: CPT

## 2022-10-08 PROCEDURE — 94760 N-INVAS EAR/PLS OXIMETRY 1: CPT

## 2022-10-08 PROCEDURE — G0378 HOSPITAL OBSERVATION PER HR: HCPCS

## 2022-10-08 PROCEDURE — 99285 EMERGENCY DEPT VISIT HI MDM: CPT

## 2022-10-08 PROCEDURE — 96367 TX/PROPH/DG ADDL SEQ IV INF: CPT

## 2022-10-08 PROCEDURE — 96376 TX/PRO/DX INJ SAME DRUG ADON: CPT

## 2022-10-08 PROCEDURE — 2500000003 HC RX 250 WO HCPCS: Performed by: EMERGENCY MEDICINE

## 2022-10-08 PROCEDURE — 99253 IP/OBS CNSLTJ NEW/EST LOW 45: CPT | Performed by: OTOLARYNGOLOGY

## 2022-10-08 PROCEDURE — 87040 BLOOD CULTURE FOR BACTERIA: CPT

## 2022-10-08 PROCEDURE — 71046 X-RAY EXAM CHEST 2 VIEWS: CPT

## 2022-10-08 PROCEDURE — 2580000003 HC RX 258: Performed by: EMERGENCY MEDICINE

## 2022-10-08 PROCEDURE — 96361 HYDRATE IV INFUSION ADD-ON: CPT

## 2022-10-08 PROCEDURE — 80053 COMPREHEN METABOLIC PANEL: CPT

## 2022-10-08 PROCEDURE — 70491 CT SOFT TISSUE NECK W/DYE: CPT

## 2022-10-08 PROCEDURE — 84145 PROCALCITONIN (PCT): CPT

## 2022-10-08 PROCEDURE — 6370000000 HC RX 637 (ALT 250 FOR IP): Performed by: INTERNAL MEDICINE

## 2022-10-08 PROCEDURE — 6370000000 HC RX 637 (ALT 250 FOR IP): Performed by: EMERGENCY MEDICINE

## 2022-10-08 PROCEDURE — 85025 COMPLETE CBC W/AUTO DIFF WBC: CPT

## 2022-10-08 PROCEDURE — 6360000004 HC RX CONTRAST MEDICATION: Performed by: EMERGENCY MEDICINE

## 2022-10-08 PROCEDURE — 96374 THER/PROPH/DIAG INJ IV PUSH: CPT

## 2022-10-08 PROCEDURE — 2580000003 HC RX 258: Performed by: INTERNAL MEDICINE

## 2022-10-08 PROCEDURE — 96365 THER/PROPH/DIAG IV INF INIT: CPT

## 2022-10-08 PROCEDURE — 83605 ASSAY OF LACTIC ACID: CPT

## 2022-10-08 PROCEDURE — 2500000003 HC RX 250 WO HCPCS: Performed by: INTERNAL MEDICINE

## 2022-10-08 PROCEDURE — 6360000002 HC RX W HCPCS: Performed by: INTERNAL MEDICINE

## 2022-10-08 PROCEDURE — 36415 COLL VENOUS BLD VENIPUNCTURE: CPT

## 2022-10-08 PROCEDURE — 96366 THER/PROPH/DIAG IV INF ADDON: CPT

## 2022-10-08 PROCEDURE — 6360000002 HC RX W HCPCS: Performed by: EMERGENCY MEDICINE

## 2022-10-08 RX ORDER — SODIUM CHLORIDE 9 MG/ML
INJECTION, SOLUTION INTRAVENOUS CONTINUOUS
Status: DISCONTINUED | OUTPATIENT
Start: 2022-10-08 | End: 2022-10-10 | Stop reason: HOSPADM

## 2022-10-08 RX ORDER — CLINDAMYCIN PHOSPHATE 600 MG/50ML
600 INJECTION INTRAVENOUS EVERY 8 HOURS
Status: DISCONTINUED | OUTPATIENT
Start: 2022-10-08 | End: 2022-10-10 | Stop reason: HOSPADM

## 2022-10-08 RX ORDER — MAGNESIUM SULFATE IN WATER 40 MG/ML
2000 INJECTION, SOLUTION INTRAVENOUS PRN
Status: DISCONTINUED | OUTPATIENT
Start: 2022-10-08 | End: 2022-10-10 | Stop reason: HOSPADM

## 2022-10-08 RX ORDER — ONDANSETRON 4 MG/1
4 TABLET, ORALLY DISINTEGRATING ORAL EVERY 8 HOURS PRN
Status: DISCONTINUED | OUTPATIENT
Start: 2022-10-08 | End: 2022-10-10 | Stop reason: HOSPADM

## 2022-10-08 RX ORDER — POLYETHYLENE GLYCOL 3350 17 G/17G
17 POWDER, FOR SOLUTION ORAL DAILY PRN
Status: DISCONTINUED | OUTPATIENT
Start: 2022-10-08 | End: 2022-10-10 | Stop reason: HOSPADM

## 2022-10-08 RX ORDER — ONDANSETRON 2 MG/ML
8 INJECTION INTRAMUSCULAR; INTRAVENOUS ONCE
Status: COMPLETED | OUTPATIENT
Start: 2022-10-08 | End: 2022-10-08

## 2022-10-08 RX ORDER — ONDANSETRON 2 MG/ML
4 INJECTION INTRAMUSCULAR; INTRAVENOUS EVERY 6 HOURS PRN
Status: DISCONTINUED | OUTPATIENT
Start: 2022-10-08 | End: 2022-10-10 | Stop reason: HOSPADM

## 2022-10-08 RX ORDER — DEXAMETHASONE SODIUM PHOSPHATE 10 MG/ML
10 INJECTION, SOLUTION INTRAMUSCULAR; INTRAVENOUS EVERY 8 HOURS
Status: DISCONTINUED | OUTPATIENT
Start: 2022-10-08 | End: 2022-10-08

## 2022-10-08 RX ORDER — 0.9 % SODIUM CHLORIDE 0.9 %
30 INTRAVENOUS SOLUTION INTRAVENOUS ONCE
Status: COMPLETED | OUTPATIENT
Start: 2022-10-08 | End: 2022-10-08

## 2022-10-08 RX ORDER — SODIUM CHLORIDE 9 MG/ML
INJECTION, SOLUTION INTRAVENOUS PRN
Status: DISCONTINUED | OUTPATIENT
Start: 2022-10-08 | End: 2022-10-10 | Stop reason: HOSPADM

## 2022-10-08 RX ORDER — ACETAMINOPHEN 160 MG/5ML
650 SOLUTION ORAL EVERY 4 HOURS PRN
Status: DISCONTINUED | OUTPATIENT
Start: 2022-10-08 | End: 2022-10-10 | Stop reason: HOSPADM

## 2022-10-08 RX ORDER — DEXAMETHASONE 4 MG/1
10 TABLET ORAL DAILY
Status: DISCONTINUED | OUTPATIENT
Start: 2022-10-08 | End: 2022-10-08 | Stop reason: ALTCHOICE

## 2022-10-08 RX ORDER — SODIUM CHLORIDE 0.9 % (FLUSH) 0.9 %
5-40 SYRINGE (ML) INJECTION PRN
Status: DISCONTINUED | OUTPATIENT
Start: 2022-10-08 | End: 2022-10-10 | Stop reason: HOSPADM

## 2022-10-08 RX ORDER — KETOROLAC TROMETHAMINE 30 MG/ML
15 INJECTION, SOLUTION INTRAMUSCULAR; INTRAVENOUS EVERY 6 HOURS PRN
Status: DISCONTINUED | OUTPATIENT
Start: 2022-10-08 | End: 2022-10-08

## 2022-10-08 RX ORDER — PSEUDOEPHEDRINE HYDROCHLORIDE 30 MG/1
60 TABLET ORAL ONCE
Status: COMPLETED | OUTPATIENT
Start: 2022-10-08 | End: 2022-10-08

## 2022-10-08 RX ORDER — MORPHINE SULFATE 4 MG/ML
4 INJECTION, SOLUTION INTRAMUSCULAR; INTRAVENOUS ONCE
Status: COMPLETED | OUTPATIENT
Start: 2022-10-08 | End: 2022-10-08

## 2022-10-08 RX ORDER — ACETAMINOPHEN 325 MG/1
650 TABLET ORAL EVERY 6 HOURS PRN
Status: DISCONTINUED | OUTPATIENT
Start: 2022-10-08 | End: 2022-10-10 | Stop reason: HOSPADM

## 2022-10-08 RX ORDER — POTASSIUM CHLORIDE 7.45 MG/ML
10 INJECTION INTRAVENOUS PRN
Status: DISCONTINUED | OUTPATIENT
Start: 2022-10-08 | End: 2022-10-10 | Stop reason: HOSPADM

## 2022-10-08 RX ORDER — SODIUM CHLORIDE 0.9 % (FLUSH) 0.9 %
5-40 SYRINGE (ML) INJECTION EVERY 12 HOURS SCHEDULED
Status: DISCONTINUED | OUTPATIENT
Start: 2022-10-08 | End: 2022-10-10 | Stop reason: HOSPADM

## 2022-10-08 RX ORDER — ACETAMINOPHEN 650 MG/1
650 SUPPOSITORY RECTAL EVERY 6 HOURS PRN
Status: DISCONTINUED | OUTPATIENT
Start: 2022-10-08 | End: 2022-10-08

## 2022-10-08 RX ORDER — ALBUTEROL SULFATE 2.5 MG/3ML
2.5 SOLUTION RESPIRATORY (INHALATION) EVERY 4 HOURS PRN
Status: DISCONTINUED | OUTPATIENT
Start: 2022-10-08 | End: 2022-10-10 | Stop reason: HOSPADM

## 2022-10-08 RX ORDER — POTASSIUM CHLORIDE 20 MEQ/1
40 TABLET, EXTENDED RELEASE ORAL PRN
Status: DISCONTINUED | OUTPATIENT
Start: 2022-10-08 | End: 2022-10-10 | Stop reason: HOSPADM

## 2022-10-08 RX ORDER — DEXAMETHASONE SODIUM PHOSPHATE 10 MG/ML
10 INJECTION, SOLUTION INTRAMUSCULAR; INTRAVENOUS EVERY 8 HOURS
Status: DISCONTINUED | OUTPATIENT
Start: 2022-10-08 | End: 2022-10-10 | Stop reason: HOSPADM

## 2022-10-08 RX ORDER — CLINDAMYCIN PHOSPHATE 600 MG/50ML
600 INJECTION INTRAVENOUS ONCE
Status: COMPLETED | OUTPATIENT
Start: 2022-10-08 | End: 2022-10-08

## 2022-10-08 RX ORDER — KETOROLAC TROMETHAMINE 30 MG/ML
15 INJECTION, SOLUTION INTRAMUSCULAR; INTRAVENOUS ONCE
Status: COMPLETED | OUTPATIENT
Start: 2022-10-08 | End: 2022-10-08

## 2022-10-08 RX ORDER — ENOXAPARIN SODIUM 100 MG/ML
40 INJECTION SUBCUTANEOUS DAILY
Status: DISCONTINUED | OUTPATIENT
Start: 2022-10-08 | End: 2022-10-10 | Stop reason: HOSPADM

## 2022-10-08 RX ADMIN — ONDANSETRON 8 MG: 2 INJECTION INTRAMUSCULAR; INTRAVENOUS at 08:47

## 2022-10-08 RX ADMIN — KETOROLAC TROMETHAMINE 15 MG: 30 INJECTION, SOLUTION INTRAMUSCULAR at 08:52

## 2022-10-08 RX ADMIN — ONDANSETRON 4 MG: 2 INJECTION INTRAMUSCULAR; INTRAVENOUS at 13:53

## 2022-10-08 RX ADMIN — Medication 10 ML: at 22:51

## 2022-10-08 RX ADMIN — POTASSIUM CHLORIDE 10 MEQ: 7.46 INJECTION, SOLUTION INTRAVENOUS at 13:50

## 2022-10-08 RX ADMIN — ACETAMINOPHEN ORAL SOLUTION 650 MG: 650 SOLUTION ORAL at 14:03

## 2022-10-08 RX ADMIN — SODIUM CHLORIDE: 9 INJECTION, SOLUTION INTRAVENOUS at 22:25

## 2022-10-08 RX ADMIN — CLINDAMYCIN PHOSPHATE 600 MG: 600 INJECTION, SOLUTION INTRAVENOUS at 17:32

## 2022-10-08 RX ADMIN — IOPAMIDOL 75 ML: 755 INJECTION, SOLUTION INTRAVENOUS at 09:17

## 2022-10-08 RX ADMIN — SODIUM CHLORIDE: 9 INJECTION, SOLUTION INTRAVENOUS at 13:51

## 2022-10-08 RX ADMIN — DEXAMETHASONE SODIUM PHOSPHATE 10 MG: 10 INJECTION, SOLUTION INTRAMUSCULAR; INTRAVENOUS at 21:27

## 2022-10-08 RX ADMIN — DEXAMETHASONE SODIUM PHOSPHATE 10 MG: 10 INJECTION, SOLUTION INTRAMUSCULAR; INTRAVENOUS at 13:53

## 2022-10-08 RX ADMIN — MORPHINE SULFATE 4 MG: 4 INJECTION, SOLUTION INTRAMUSCULAR; INTRAVENOUS at 12:00

## 2022-10-08 RX ADMIN — Medication 1000 MG: at 10:56

## 2022-10-08 RX ADMIN — SODIUM CHLORIDE 1000 ML: 9 INJECTION, SOLUTION INTRAVENOUS at 08:39

## 2022-10-08 RX ADMIN — MAGNESIUM SULFATE HEPTAHYDRATE 2000 MG: 40 INJECTION, SOLUTION INTRAVENOUS at 15:32

## 2022-10-08 RX ADMIN — PSEUDOEPHEDRINE HCL 60 MG: 30 TABLET, FILM COATED ORAL at 08:54

## 2022-10-08 RX ADMIN — CLINDAMYCIN PHOSPHATE 600 MG: 600 INJECTION, SOLUTION INTRAVENOUS at 10:45

## 2022-10-08 RX ADMIN — ACETAMINOPHEN ORAL SOLUTION 650 MG: 650 SOLUTION ORAL at 20:06

## 2022-10-08 ASSESSMENT — PAIN DESCRIPTION - ORIENTATION
ORIENTATION: RIGHT;LEFT
ORIENTATION: RIGHT;LEFT;ANTERIOR
ORIENTATION: RIGHT;LEFT
ORIENTATION: RIGHT;LEFT

## 2022-10-08 ASSESSMENT — PAIN DESCRIPTION - LOCATION
LOCATION: THROAT
LOCATION: JAW;THROAT
LOCATION: JAW;THROAT
LOCATION: JAW;NECK
LOCATION: THROAT

## 2022-10-08 ASSESSMENT — PAIN SCALES - GENERAL
PAINLEVEL_OUTOF10: 10
PAINLEVEL_OUTOF10: 8
PAINLEVEL_OUTOF10: 10
PAINLEVEL_OUTOF10: 8
PAINLEVEL_OUTOF10: 6

## 2022-10-08 ASSESSMENT — PAIN DESCRIPTION - FREQUENCY
FREQUENCY: CONTINUOUS

## 2022-10-08 ASSESSMENT — PAIN DESCRIPTION - PAIN TYPE
TYPE: ACUTE PAIN

## 2022-10-08 ASSESSMENT — PAIN DESCRIPTION - DESCRIPTORS
DESCRIPTORS: ACHING;STABBING;SHARP
DESCRIPTORS: ACHING;THROBBING;SHARP
DESCRIPTORS: THROBBING;STABBING;SHARP

## 2022-10-08 ASSESSMENT — LIFESTYLE VARIABLES
HOW OFTEN DO YOU HAVE A DRINK CONTAINING ALCOHOL: NEVER
HOW MANY STANDARD DRINKS CONTAINING ALCOHOL DO YOU HAVE ON A TYPICAL DAY: PATIENT DOES NOT DRINK

## 2022-10-08 ASSESSMENT — PAIN - FUNCTIONAL ASSESSMENT: PAIN_FUNCTIONAL_ASSESSMENT: 0-10

## 2022-10-08 NOTE — ED NOTES
Patient c/o more pain and trouble breathing as he prepares to leave the floor - pain meds administered per physician     Edmundo Nguyen RN  10/08/22 2424

## 2022-10-08 NOTE — LETTER
[facility]  [address]  Phone: [phone]             October 10, 2022    Patient: Abhinav Fuller   YOB: 2000   Date of Visit: 10/8/2022       To Whom It May Concern:    Abhinav Fuller was seen and treated in our facility  beginning 10/8/2022 until 10/10/2022. He may return to school on 10/13/2022.       Sincerely,       Afsaneh Dodson RN         Signature:__________________________________

## 2022-10-08 NOTE — PROGRESS NOTES
1200-Patient admitted to unit from ED. Patient is awake, alert and oriented. Patient here for airway management. Patient diagnosed with mono few days ago and is experiencing swelling and pain in throat and neck. Patient placed on monitor, VS taken and assessment completed. Admission paperwork complete. Patient c/o pain and received morphine at 1150. Patient oriented to room and call light. Patient instructed to call for assistance and before he gets up to go to bathroom. Patients girlfriend is at bedside    1430-Fluids started and potassium chloride infusing. Patient to receive magnesium and antibiotics also. Tylenol given for pain.  Patient eating popsicle at this time without difficulty

## 2022-10-08 NOTE — H&P
Hospital Medicine History & Physical      PCP: No primary care provider on file. Date of Admission: 10/8/2022    Date of Service: Pt seen/examined on 10/08/22 and placed in observation    Chief Complaint: Sore throat      History Of Present Illness:      24 y.o. male who presented to [facility] with worsening sore throat and neck pain. Was diagnosed 2 days ago with infectious mononucleosis. Given symptomatic management. Sore throat got worse and patient presented to emergency department. CT scan of the neck did not show any new findings. Admitted for symptomatic sore throat  No other accompanying symptoms  Nothing onset makes the patient feel better or worse    Past Medical History:      History reviewed. No pertinent past medical history. Past Surgical History:      History reviewed. No pertinent surgical history. Medications Prior to Admission:      Prior to Admission medications    Medication Sig Start Date End Date Taking? Authorizing Provider   naproxen (NAPROSYN) 500 MG tablet Take 1 tablet by mouth 2 times daily (with meals) for 10 days 10/6/22 10/16/22  Dotty Berkowitz DO       Allergies:  Patient has no known allergies. Social History:      The patient currently lives at home    TOBACCO:   reports that he has never smoked. He has never used smokeless tobacco.  ETOH:   reports no history of alcohol use. E-cigarette/Vaping       Questions Responses    E-cigarette/Vaping Use     Start Date     Passive Exposure     Quit Date     Counseling Given     Comments               Family History:      Reviewed and negative in regards to presenting illness/complaint. History reviewed. No pertinent family history. REVIEW OF SYSTEMS COMPLETED:   Pertinent positives as noted in the HPI. Sore throat. All other systems reviewed and negative.     PHYSICAL EXAM PERFORMED:    /73   Pulse (!) 107   Temp 99.2 °F (37.3 °C) (Oral)   Resp 21   Ht 5' 5\" (1.651 m)   Wt 212 lb 4.8 oz (96.3 kg)   SpO2 98%   BMI 35.33 kg/m²     General appearance:  No apparent distress, appears stated age and cooperative. HEENT:  Normal cephalic, atraumatic without obvious deformity. Pupils equal, round, and reactive to light. Extra ocular muscles intact. Conjunctivae/corneas clear. Neck: Supple, with full range of motion. Bilateral enlarged and tender cervical lymph nodes more on the left with visibly enlarged eft parotid gland  Respiratory:  Normal respiratory effort. Clear to auscultation, bilaterally without Rales/Wheezes/Rhonchi. Cardiovascular:  Regular rate and rhythm with normal S1/S2 without murmurs, rubs or gallops. Abdomen: Soft, non-tender, non-distended with normal bowel sounds. Musculoskeletal:  No clubbing, cyanosis or edema bilaterally. Full range of motion without deformity. Skin: Skin color, texture, turgor normal.  No rashes or lesions. Neurologic:  Neurovascularly intact without any focal sensory/motor deficits. Cranial nerves: II-XII intact, grossly non-focal.  Psychiatric:  Alert and oriented, thought content appropriate, normal insight  Capillary Refill: Brisk,3 seconds, normal  Peripheral Pulses: +2 palpable, equal bilaterally       Labs:     Recent Labs     10/06/22  1905 10/08/22  0801   WBC 13.4* 12.0*   HGB 14.4 13.8   HCT 42.0 39.7*    218     Recent Labs     10/06/22  1905 10/08/22  0801    135*   K 3.3* 3.1*    100   CO2 23 23   BUN 10 11   CREATININE 1.2 0.9   CALCIUM 9.2 8.7     Recent Labs     10/08/22  0801   AST 58*   ALT 66*   BILITOT 1.2*   ALKPHOS 71     No results for input(s): INR in the last 72 hours. No results for input(s): Aly Gubler in the last 72 hours.     Urinalysis:    No results found for: Cesilia Gates, BACTERIA, [address] [facility], [facility], [address], [facility]    Radiology:     CXR: I have reviewed the CXR with the following interpretation: No acute change  EKG:  I have reviewed the EKG with the following interpretation: Not done today    CT SOFT TISSUE NECK W CONTRAST   Final Result   No significant change compared to CT neck from 10/06/2022. Severe pharyngitis/tonsillitis without discrete abscess. RECOMMENDATIONS:   Unavailable         XR CHEST (2 VW)   Final Result   No acute cardiopulmonary disease. Consults:    IP CONSULT TO OTOLARYNGOLOGY    ASSESSMENT:    Active Hospital Problems    Diagnosis Date Noted    Acute pharyngitis due to infectious mononucleosis [B27.90] 10/08/2022     Priority: Medium         PLAN:    Acute pharyngitis  Most likely viral.  I will check procalcitonin. ENT consulted by ED. Continue supportive management. Does not seem to require respiratory support at this time. Infectious mononucleosis  Findings of blood count and differential in addition to fever are most likely secondary to infectious mononucleosis and not sepsis. I do not believe patient has sepsis and will not proceed with sepsis treatment. Sepsis has been ruled out. Leukocytosis  Secondary to infectious mononucleosis. Continue to monitor. Discussed with the patient. Questions answered    DVT Prophylaxis: Lovenox  Diet: Full liquid diet  Code Status: Full code    PT/OT Eval Status: Not indicated    Dispo -observation stay pending clinical improved       Ned Frank MD    Thank you for the opportunity to be involved in this patient's care. If you have any questions or concerns please feel free to contact me at [phone].

## 2022-10-08 NOTE — RT PROTOCOL NOTE
RT Inhaler-Nebulizer Bronchodilator Protocol Note    There is a bronchodilator order in the chart from a provider indicating to follow the RT Bronchodilator Protocol and there is an Initiate RT Inhaler-Nebulizer Bronchodilator Protocol order as well (see protocol at bottom of note). CXR Findings:  No results found. The findings from the last RT Protocol Assessment were as follows:   History Pulmonary Disease: None or smoker <15 pack years  Respiratory Pattern: Dyspnea on exertion or RR 21-25 bpm  Breath Sounds: Clear breath sounds  Cough: Strong, spontaneous, non-productive  Indication for Bronchodilator Therapy:    Bronchodilator Assessment Score: 2    Aerosolized bronchodilator medication orders have been revised according to the RT Inhaler-Nebulizer Bronchodilator Protocol below. Respiratory Therapist to perform RT Therapy Protocol Assessment initially then follow the protocol. Repeat RT Therapy Protocol Assessment PRN for score 0-3 or on second treatment, BID, and PRN for scores above 3. No Indications - adjust the frequency to every 6 hours PRN wheezing or bronchospasm, if no treatments needed after 48 hours then discontinue using Per Protocol order mode. If indication present, adjust the RT bronchodilator orders based on the Bronchodilator Assessment Score as indicated below. Use Inhaler orders unless patient has one or more of the following: on home nebulizer, not able to hold breath for 10 seconds, is not alert and oriented, cannot activate and use MDI correctly, or respiratory rate 25 breaths per minute or more, then use the equivalent nebulizer order(s) with same Frequency and PRN reasons based on the score. If a patient is on this medication at home then do not decrease Frequency below that used at home.     0-3 - enter or revise RT bronchodilator order(s) to equivalent RT Bronchodilator order with Frequency of every 4 hours PRN for wheezing or increased work of breathing using Per Protocol order mode. 4-6 - enter or revise RT Bronchodilator order(s) to two equivalent RT bronchodilator orders with one order with BID Frequency and one order with Frequency of every 4 hours PRN wheezing or increased work of breathing using Per Protocol order mode. 7-10 - enter or revise RT Bronchodilator order(s) to two equivalent RT bronchodilator orders with one order with TID Frequency and one order with Frequency of every 4 hours PRN wheezing or increased work of breathing using Per Protocol order mode. 11-13 - enter or revise RT Bronchodilator order(s) to one equivalent RT bronchodilator order with QID Frequency and an Albuterol order with Frequency of every 4 hours PRN wheezing or increased work of breathing using Per Protocol order mode. Greater than 13 - enter or revise RT Bronchodilator order(s) to one equivalent RT bronchodilator order with every 4 hours Frequency and an Albuterol order with Frequency of every 2 hours PRN wheezing or increased work of breathing using Per Protocol order mode. RT to enter RT Home Evaluation for COPD & MDI Assessment order using Per Protocol order mode.     Electronically signed by Rodrigo Jernigan RCP on 10/8/2022 at 12:32 PM

## 2022-10-08 NOTE — PLAN OF CARE
Patient will be free from injury during hospitalization. Patient bed in low position, wheels locked and call light in reach. Patient instructed to call for assistance/needs.  Patient and girlfriend verbalized understanding

## 2022-10-08 NOTE — CONSULTS
[place]      Patient Name: Devon [address] Record Number:  [id number]  Primary Care Physician:  No primary care provider on file. Date of Consultation: 10/8/2022    Chief Complaint: respiratory distress        HISTORY OF PRESENT ILLNESS  Almita Connolly is a(n) 24 y.o. male who presented to the ER today with increasing respiratory distress. He was here on 10/6/22 and diagnosed with mono. He says that his pain and trouble breathing started to get worse so he came back to the ER. He has never had anything like this. Denies hx of recurrent pharyngitis. Otherwise healthy. He feels as though it is getting harder to breathing and he has severe pain when swallowing. REVIEW OF SYSTEMS  As above    PHYSICAL EXAM  GENERAL: muffled voice, stertor noted, no stridor; mild increased work of breathing  EYES: EOMI, Anti-icteric  HENT:   Head: Normocephalic and atraumatic. Face:  Symmetric, facial nerve intact, no sinus tenderness  Right Ear: Normal external ear  Left Ear: Normal external ear  Mouth/Oral Cavity:  normal lips, Uvula is midline, no mucosal lesions, no trismus  Oropharynx/Larynx:  very large touching tonsils; symmetric, white exudate  Nose:Normal external nasal appearance. NECK: large bilateral tender cervical lymphadenopathy  CHEST: Normal respiratory effort, no retractions, breathing comfortably  SKIN: No rashes, normal appearing skin, no evidence of skin lesions/tumors  Neuro:  cranial nerve II-XII intact; normal gait  Cardio:  no edema        RADIOLOGY  Summary of findings:  I reviewed his CT neck from today. He has severe enlargement of his tonsillar tissue including palatine, lingual and adenoid tonsils. Large bilateral cervical lymphadenopathy.   No obvious abscess          ASSESSMENT/PLAN  Severe pharyngitis from mono with respiratory distress.  -would cover secondary bacterial infection (clindamycin, Rocephin ok)  -Decadron 10 mg every 8 hours  -Have Bipap machine available, but low threshold to intubate given severity of tonsillar swelling.  -If patient requires intubation,there is a good chance we would need to take his tonsils prior to extubation as they will likely remain enlarged for some time with mono.    -avoid NSAIDs as this would result increased bleeding risk if he requires a tonsillectomy.  -Please call if there is any decline in his respiratory status. I have performed a head and neck physical exam personally or was physically present during the key or critical portions of the service. This note was generated completely or in part utilizing Dragon dictation speech recognition software. Occasionally, words are mistranscribed and despite editing, the text may contain inaccuracies due to incorrect word recognition. If further clarification is needed please contact the office at [phone].

## 2022-10-08 NOTE — ED PROVIDER NOTES
[address] PROVIDER NOTE    Patient Identification  Pt Name: Rosalba Acosta  MRN: [id number]  Elvagfsameer 2000  Date of evaluation: 10/8/2022  Provider: Nicole Nunez MD  PCP: No primary care provider on file. Chief Complaint  Facial Swelling (Here on 10/6 and dx with mono. Now c/o swelling in jaw and mouth, having a hard time swallowing and feels short of breath.)      HPI  (History provided by patient)  This is a 24 y.o. male who was brought in by self for severe throat swelling with difficulty breathing and swallowing. He was originally seen 2 days ago on October 6. He was diagnosed with mononucleosis and treated with steroids, then was discharged home. Unfortunately, symptoms have continued to worsen. He feels he is having increasing swelling in his throat. Specifically, he reports increasing difficulty breathing and difficulty swallowing. Swallowing is incredibly painful. Although he is able to get fluids down, saliva builds up, it is difficult to swallow, so he spits it back up. He has had subjective fever. He denies chest pain, abdominal pain, cough, and vomiting. He is having associated fatigue and generalized weakness    ROS  10 systems reviewed, pertinent positives/negatives per HPI otherwise noted to be negative. I have reviewed the following nursing documentation:  Allergies: Patient has no known allergies. Past medical history: History reviewed. No pertinent past medical history. Past surgical history: History reviewed. No pertinent surgical history. Home medications:   Previous Medications    NAPROXEN (NAPROSYN) 500 MG TABLET    Take 1 tablet by mouth 2 times daily (with meals) for 10 days       Social history:  reports that he has never smoked. He has never used smokeless tobacco. He reports current drug use. Drug: Marijuana Daril Pedrito). He reports that he does not drink alcohol. Family history:  History reviewed.  No pertinent family history. Exam  ED Triage Vitals [10/08/22 0750]   BP Temp Temp Source Heart Rate Resp SpO2 Height Weight   (!) 159/97 99.2 °F (37.3 °C) Oral (!) 120 24 96 % 5' 5\" (1.651 m) 212 lb 4.8 oz (96.3 kg)     Nursing note and vitals reviewed. Constitutional: Ill-appearing and in mild distress  HENT:      Head: Normocephalic and atraumatic. Ears: External ears normal.      Nose: Nose congested     Mouth: Membrane mucosa moist and pink. Severe tonsillar and pharyngeal edema with associated erythema and tonsillar exudates. Airway significantly obstructed by edema. Mild stridor present. Patient is able to swallow water (tested during my exam) although it is incredibly painful. Eyes: Anicteric sclera. No discharge. Neck: Supple. Trachea midline. +LAD  Cardiovascular: Tachycardic with regular rhythm; no murmurs, rubs, or gallops. Pulmonary/Chest: Tachypnea without respiratory stress or increased work of breathing. CTAB. No stridor. No wheezes. No rales. Abdominal: Soft. No distension. Musculoskeletal: Moves all extremities. No gross deformity. Neurological: Alert and oriented. Face symmetric. Speech is clear. Skin: Warm and dry. No rash. Psychiatric: Normal mood and affect. Behavior is normal.      Radiology  CT SOFT TISSUE NECK W CONTRAST   Final Result   No significant change compared to CT neck from 10/06/2022. Severe pharyngitis/tonsillitis without discrete abscess. RECOMMENDATIONS:   Unavailable         XR CHEST (2 VW)   Final Result   No acute cardiopulmonary disease.              Labs  Results for orders placed or performed during the hospital encounter of 10/08/22   CBC with Auto Differential   Result Value Ref Range    WBC 12.0 (H) 4.0 - 11.0 K/uL    RBC 4.75 4.20 - 5.90 M/uL    Hemoglobin 13.8 13.5 - 17.5 g/dL    Hematocrit 39.7 (L) 40.5 - 52.5 %    MCV 83.5 80.0 - 100.0 fL    MCH 29.1 26.0 - 34.0 pg    MCHC 34.9 31.0 - 36.0 g/dL    RDW 13.7 12.4 - 15.4 %    Platelets 478 811 - 650 K/uL MPV 8.6 5.0 - 10.5 fL    Path Consult Yes     Neutrophils % 38.0 %    Lymphocytes % 30.0 %    Monocytes % 17.0 %    Eosinophils % 0.0 %    Basophils % 0.0 %    Neutrophils Absolute 6.0 1.7 - 7.7 K/uL    Lymphocytes Absolute 3.6 1.0 - 5.1 K/uL    Monocytes Absolute 2.0 (H) 0.0 - 1.3 K/uL    Eosinophils Absolute 0.0 0.0 - 0.6 K/uL    Basophils Absolute 0.0 0.0 - 0.2 K/uL    Bands Relative 12 (H) 0 - 7 %    Unid. Mononu 3 (A) %   Comprehensive Metabolic Panel   Result Value Ref Range    Sodium 135 (L) 136 - 145 mmol/L    Potassium 3.1 (L) 3.5 - 5.1 mmol/L    Chloride 100 99 - 110 mmol/L    CO2 23 21 - 32 mmol/L    Anion Gap 12 3 - 16    Glucose 146 (H) 70 - 99 mg/dL    BUN 11 7 - 20 mg/dL    Creatinine 0.9 0.9 - 1.3 mg/dL    GFR Non-African American >60 >60    GFR African American >60 >60    Calcium 8.7 8.3 - 10.6 mg/dL    Total Protein 8.4 (H) 6.4 - 8.2 g/dL    Albumin 3.9 3.4 - 5.0 g/dL    Albumin/Globulin Ratio 0.9 (L) 1.1 - 2.2    Total Bilirubin 1.2 (H) 0.0 - 1.0 mg/dL    Alkaline Phosphatase 71 40 - 129 U/L    ALT 66 (H) 10 - 40 U/L    AST 58 (H) 15 - 37 U/L   Lactate, Sepsis   Result Value Ref Range    Lactic Acid, Sepsis 1.5 0.4 - 1.9 mmol/L     SEP-1  Is this patient to be included in the SEP-1 Core Measure due to severe sepsis or septic shock? Yes   SEP-1 CORE MEASURE DATA      Sepsis Criteria   Severe Sepsis Criteria   Septic Shock Criteria     Must be confirmed or suspected to move forward with diagnosis of sepsis. Must meet 2:    [] Temperature > 100.9 F (38.3 C)        or < 96.8 F (36 C)  [x] HR > 90  [] RR > 20  [x] WBC > 12 or < 4 or 10% bands      AND:      [x] Infection Confirmed or        Suspected.      Must meet 1:    [] Lactate > 2       or   [] Signs of Organ Dysfunction:    - SBP < 90 or MAP < 65  - Altered mental status  - Creatinine > 2 or increased from      baseline  - Urine Output < 0.5 ml/kg/hr  - Bilirubin > 2  - INR > 1.5 (not anticoagulated)  - Platelets < 370,566  - Acute Respiratory Failure as     evidenced by new need for NIPPV     or mechanical ventilation      [x] No criteria met for Severe Sepsis. Must meet 1:    [] Lactate > 4        or   [] SBP < 90 or MAP < 65 for at        least two readings in the first        hour after fluid bolus        administration      [] Vasopressors initiated (if hypotension persists after fluid resuscitation)        [x] No criteria met for Septic Shock. Patient Vitals for the past 6 hrs:   BP Temp Pulse Resp SpO2 Height Weight Weight Method Percent Weight Change   10/08/22 0750 (!) 159/97 99.2 °F (37.3 °C) (!) 120 24 96 % 5' 5\" (1.651 m) 212 lb 4.8 oz (96.3 kg) Actual;Standing scale 0   10/08/22 0824 (!) 146/77 -- (!) 116 25 98 % -- -- -- --   10/08/22 0834 139/76 -- (!) 113 22 97 % -- -- -- --   10/08/22 0845 (!) 141/76 -- (!) 109 22 97 % -- -- -- --   10/08/22 0904 130/73 -- (!) 107 21 98 % -- -- -- --      Recent Labs     10/06/22  1905 10/08/22  0801   WBC 13.4* 12.0*   CREATININE 1.2 0.9   BILITOT  --  1.2*    218         Time  sepsis  Identified: 1030    Fluid Resuscitation Rational: at least 30mL/kg based on entered actual weight at time of triage      Repeat lactate level: not indicated due to initial lactate < 2    Reassessment Exam:   Not applicable. Patient does not have septic shock. MDM and ED Course  Patient presents to the emergency department with worsening of already severe pharyngitis. He was seen here 2 days ago by Dr. Severo Silvers and Faizan Gonzalez and had labs and imaging performed at the time. He was found to have mononucleosis, was treated with dexamethasone, and was discharged home. He reports increased swelling since then, increased pain, and now difficulty handling secretions and swallowing. Repeat CBC showed improved leukocytosis, but now he has 12 bands, which he did not have previously. Repeat CT shows no change in his acute pharyngitis/tonsillitis and no abscess.  However, there appears to be significant edema into the posterior pharynx, likely related to his current symptoms. After nearly 2 L of fluid, the patient remains tachycardic. Symptoms have not improved with treatment provided here in the emergency department. I am concerned that he has developed an opportunistic bacterial infection on top of the mononucleosis he already has. He meets criteria for sepsis, so initiated IV antibiotics with Unasyn. As the patient is young, I have added additional swabs, including for pharyngeal gonorrhea. I have also treated with additional dexamethasone. I consulted ENT, speaking to Dr. Rosa M Reilly to make them aware, but I do not think the patient warrants intubation at this time. However, if his swelling worsens, he may need intervention. I consulted Dr. Jett Meyer through [address] [place] [address] for admission. He reviewed the patient's history, physical exam, labs, imaging studies, and emergency department course and has decided to admit [facility] for further evaluation and treatment. As I have deemed necessary from their history, physical, and studies, I have considered and evaluated [facility] for the following diagnoses:  ANAPHYLAXIS, ESOPHAGEAL FOREIGN BODY, DEEP SPACE INFECTION, EPIGLOTTITIS, PERITONSILLAR ABSCESS, MENINGITIS, AIRWAY COMPROMISE, ANGIOEDEMA    The total Critical Care time is 30 minutes which excludes separately billable procedures. Final Impression  1. Acute pharyngitis, unspecified etiology    2. Septicemia ([address]    3. Stridor        Blood pressure 130/73, pulse (!) 107, temperature 99.2 °F (37.3 °C), temperature source Oral, resp. rate 21, height 5' 5\" (1.651 m), weight 212 lb 4.8 oz (96.3 kg), SpO2 98 %. Disposition:  Admit      This chart was generated using the [address] [place], [place] [address]  dictation system. I created this record but it may contain dictation errors given the limitations of this technology.        Jeff Coelho MD  10/11/22 1529

## 2022-10-08 NOTE — PROGRESS NOTES
10/08/22 1231   RT Protocol   History Pulmonary Disease 0   Respiratory pattern 2   Breath sounds 0   Cough 0   Bronchodilator Assessment Score 2

## 2022-10-08 NOTE — ED NOTES
Pt states that he feels like his tongue is swelling and that his throat is swelling more than when he first arrived here at this medical facility.  - provider notified and will see him again     Lina Hedrick RN  10/08/22 3916

## 2022-10-09 LAB
A/G RATIO: 0.9 (ref 1.1–2.2)
ALBUMIN SERPL-MCNC: 3.6 G/DL (ref 3.4–5)
ALP BLD-CCNC: 61 U/L (ref 40–129)
ALT SERPL-CCNC: 57 U/L (ref 10–40)
ANION GAP SERPL CALCULATED.3IONS-SCNC: 12 MMOL/L (ref 3–16)
AST SERPL-CCNC: 38 U/L (ref 15–37)
BASOPHILS ABSOLUTE: 0 K/UL (ref 0–0.2)
BASOPHILS RELATIVE PERCENT: 0.3 %
BILIRUB SERPL-MCNC: 0.7 MG/DL (ref 0–1)
BUN BLDV-MCNC: 9 MG/DL (ref 7–20)
CALCIUM SERPL-MCNC: 8.4 MG/DL (ref 8.3–10.6)
CHLORIDE BLD-SCNC: 104 MMOL/L (ref 99–110)
CO2: 19 MMOL/L (ref 21–32)
CREAT SERPL-MCNC: 0.8 MG/DL (ref 0.9–1.3)
EOSINOPHILS ABSOLUTE: 0 K/UL (ref 0–0.6)
EOSINOPHILS RELATIVE PERCENT: 0 %
GFR AFRICAN AMERICAN: >60
GFR NON-AFRICAN AMERICAN: >60
GLUCOSE BLD-MCNC: 156 MG/DL (ref 70–99)
HCT VFR BLD CALC: 37.8 % (ref 40.5–52.5)
HEMOGLOBIN: 12.9 G/DL (ref 13.5–17.5)
LYMPHOCYTES ABSOLUTE: 4.7 K/UL (ref 1–5.1)
LYMPHOCYTES RELATIVE PERCENT: 30.3 %
MAGNESIUM: 2.5 MG/DL (ref 1.8–2.4)
MCH RBC QN AUTO: 28.6 PG (ref 26–34)
MCHC RBC AUTO-ENTMCNC: 34 G/DL (ref 31–36)
MCV RBC AUTO: 84 FL (ref 80–100)
MONOCYTES ABSOLUTE: 0.9 K/UL (ref 0–1.3)
MONOCYTES RELATIVE PERCENT: 6 %
NEUTROPHILS ABSOLUTE: 9.7 K/UL (ref 1.7–7.7)
NEUTROPHILS RELATIVE PERCENT: 63.4 %
PDW BLD-RTO: 13.6 % (ref 12.4–15.4)
PLATELET # BLD: 235 K/UL (ref 135–450)
PMV BLD AUTO: 8.1 FL (ref 5–10.5)
POTASSIUM REFLEX MAGNESIUM: 3.4 MMOL/L (ref 3.5–5.1)
RBC # BLD: 4.5 M/UL (ref 4.2–5.9)
SODIUM BLD-SCNC: 135 MMOL/L (ref 136–145)
TOTAL PROTEIN: 7.6 G/DL (ref 6.4–8.2)
WBC # BLD: 15.4 K/UL (ref 4–11)

## 2022-10-09 PROCEDURE — 6370000000 HC RX 637 (ALT 250 FOR IP): Performed by: INTERNAL MEDICINE

## 2022-10-09 PROCEDURE — 94761 N-INVAS EAR/PLS OXIMETRY MLT: CPT

## 2022-10-09 PROCEDURE — 2580000003 HC RX 258: Performed by: INTERNAL MEDICINE

## 2022-10-09 PROCEDURE — 36415 COLL VENOUS BLD VENIPUNCTURE: CPT

## 2022-10-09 PROCEDURE — 96366 THER/PROPH/DIAG IV INF ADDON: CPT

## 2022-10-09 PROCEDURE — G0378 HOSPITAL OBSERVATION PER HR: HCPCS

## 2022-10-09 PROCEDURE — 6360000002 HC RX W HCPCS: Performed by: INTERNAL MEDICINE

## 2022-10-09 PROCEDURE — 83735 ASSAY OF MAGNESIUM: CPT

## 2022-10-09 PROCEDURE — 1200000000 HC SEMI PRIVATE

## 2022-10-09 PROCEDURE — 96376 TX/PRO/DX INJ SAME DRUG ADON: CPT

## 2022-10-09 PROCEDURE — 99231 SBSQ HOSP IP/OBS SF/LOW 25: CPT | Performed by: OTOLARYNGOLOGY

## 2022-10-09 PROCEDURE — 2500000003 HC RX 250 WO HCPCS: Performed by: INTERNAL MEDICINE

## 2022-10-09 PROCEDURE — 80053 COMPREHEN METABOLIC PANEL: CPT

## 2022-10-09 PROCEDURE — 85025 COMPLETE CBC W/AUTO DIFF WBC: CPT

## 2022-10-09 RX ADMIN — DEXAMETHASONE SODIUM PHOSPHATE 10 MG: 10 INJECTION, SOLUTION INTRAMUSCULAR; INTRAVENOUS at 14:53

## 2022-10-09 RX ADMIN — CLINDAMYCIN PHOSPHATE 600 MG: 600 INJECTION, SOLUTION INTRAVENOUS at 09:48

## 2022-10-09 RX ADMIN — Medication 10 ML: at 22:36

## 2022-10-09 RX ADMIN — DEXAMETHASONE SODIUM PHOSPHATE 10 MG: 10 INJECTION, SOLUTION INTRAMUSCULAR; INTRAVENOUS at 06:03

## 2022-10-09 RX ADMIN — CLINDAMYCIN PHOSPHATE 600 MG: 600 INJECTION, SOLUTION INTRAVENOUS at 18:32

## 2022-10-09 RX ADMIN — ACETAMINOPHEN ORAL SOLUTION 650 MG: 650 SOLUTION ORAL at 10:02

## 2022-10-09 RX ADMIN — CLINDAMYCIN PHOSPHATE 600 MG: 600 INJECTION, SOLUTION INTRAVENOUS at 03:27

## 2022-10-09 RX ADMIN — SODIUM CHLORIDE: 9 INJECTION, SOLUTION INTRAVENOUS at 08:28

## 2022-10-09 RX ADMIN — SODIUM CHLORIDE, PRESERVATIVE FREE 10 ML: 5 INJECTION INTRAVENOUS at 14:53

## 2022-10-09 RX ADMIN — Medication 10 ML: at 09:51

## 2022-10-09 RX ADMIN — SODIUM CHLORIDE: 9 INJECTION, SOLUTION INTRAVENOUS at 20:53

## 2022-10-09 RX ADMIN — DEXAMETHASONE SODIUM PHOSPHATE 10 MG: 10 INJECTION, SOLUTION INTRAMUSCULAR; INTRAVENOUS at 22:34

## 2022-10-09 ASSESSMENT — PAIN SCALES - GENERAL
PAINLEVEL_OUTOF10: 8
PAINLEVEL_OUTOF10: 8

## 2022-10-09 ASSESSMENT — PAIN DESCRIPTION - LOCATION: LOCATION: HEAD

## 2022-10-09 ASSESSMENT — PAIN DESCRIPTION - FREQUENCY: FREQUENCY: INTERMITTENT

## 2022-10-09 ASSESSMENT — PAIN DESCRIPTION - DESCRIPTORS: DESCRIPTORS: NAGGING

## 2022-10-09 ASSESSMENT — PAIN DESCRIPTION - PAIN TYPE: TYPE: ACUTE PAIN

## 2022-10-09 NOTE — PLAN OF CARE
Problem: Pain  Goal: Verbalizes/displays adequate comfort level or baseline comfort level  Flowsheets (Taken 10/9/2022 6591)  Verbalizes/displays adequate comfort level or baseline comfort level: Assess pain using appropriate pain scale   Patient c/o a headache 8/10. Pt medicated per MAR.

## 2022-10-09 NOTE — PROGRESS NOTES
Hospitalist Progress Note      PCP: No primary care provider on file. Date of Admission: 10/8/2022    Chief Complaint: Sore throat    Hospital Course: Admitted with worsening pharyngitis and sore throat. Patient with recently diagnosed infectious mononucleosis. Feels better today. Subjective: Throat pain is controlled. Fully ambulatory, awake alert and oriented. No chest pain, no shortness of breath, no nausea or vomiting. Able to swallow full liquid diet      Medications:  Reviewed    Infusion Medications    sodium chloride      sodium chloride 125 mL/hr at 10/09/22 3971     Scheduled Medications    sodium chloride flush  5-40 mL IntraVENous 2 times per day    enoxaparin  40 mg SubCUTAneous Daily    clindamycin (CLEOCIN) IV  600 mg IntraVENous Q8H    dexamethasone  10 mg IntraVENous Q8H     PRN Meds: sodium chloride flush, sodium chloride, ondansetron **OR** ondansetron, polyethylene glycol, acetaminophen **OR** [DISCONTINUED] acetaminophen, potassium chloride **OR** potassium alternative oral replacement **OR** potassium chloride, magnesium sulfate, albuterol, acetaminophen      Intake/Output Summary (Last 24 hours) at 10/9/2022 1119  Last data filed at 10/8/2022 1930  Gross per 24 hour   Intake --   Output 275 ml   Net -275 ml       Physical Exam Performed:    BP (!) 142/89   Pulse (!) 110   Temp 97.9 °F (36.6 °C) (Oral)   Resp 20   Ht 5' 5\" (1.651 m)   Wt 210 lb 9.6 oz (95.5 kg)   SpO2 98%   BMI 35.05 kg/m²     General appearance: No apparent distress, appears stated age and cooperative. HEENT: Pupils equal, round, and reactive to light. Conjunctivae/corneas clear. Visibly swollen left parotid gland with tenderness, less so than yesterday. Neck: Supple, with full range of motion. No jugular venous distention. Trachea midline. Cervical lymph nodes still enlarged but slightly less tender compared to yesterday. Respiratory:  Normal respiratory effort.  Clear to auscultation, bilaterally without Rales/Wheezes/Rhonchi. Cardiovascular: Regular rate and rhythm with normal S1/S2 without murmurs, rubs or gallops. Abdomen: Soft, non-tender, non-distended with normal bowel sounds. Musculoskeletal: No clubbing, cyanosis or edema bilaterally. Full range of motion without deformity. Skin: Skin color, texture, turgor normal.  No rashes or lesions. Neurologic:  Neurovascularly intact without any focal sensory/motor deficits. Cranial nerves: II-XII intact, grossly non-focal.  Psychiatric: Alert and oriented, thought content appropriate, normal insight  Capillary Refill: Brisk, 3 seconds, normal   Peripheral Pulses: +2 palpable, equal bilaterally       Labs:   Recent Labs     10/06/22  1905 10/08/22  0801 10/09/22  0530   WBC 13.4* 12.0* 15.4*   HGB 14.4 13.8 12.9*   HCT 42.0 39.7* 37.8*    218 235     Recent Labs     10/06/22  1905 10/08/22  0801 10/09/22  0530    135* 135*   K 3.3* 3.1* 3.4*    100 104   CO2 23 23 19*   BUN 10 11 9   CREATININE 1.2 0.9 0.8*   CALCIUM 9.2 8.7 8.4     Recent Labs     10/08/22  0801 10/09/22  0530   AST 58* 38*   ALT 66* 57*   BILITOT 1.2* 0.7   ALKPHOS 71 61     No results for input(s): INR in the last 72 hours. No results for input(s): [place] in the last 72 hours. Urinalysis:    No results found for: Geovanni Castles, BACTERIA, RBCUA, BLOODU, SPECGRAV, [address]    Radiology:  CT SOFT TISSUE NECK W CONTRAST   Final Result   No significant change compared to CT neck from 10/06/2022. Severe pharyngitis/tonsillitis without discrete abscess. RECOMMENDATIONS:   Unavailable         XR CHEST (2 VW)   Final Result   No acute cardiopulmonary disease. Assessment/Plan:    Active Hospital Problems    Diagnosis     Acute pharyngitis due to infectious mononucleosis [B27.90]      Priority: Medium     PLAN:    Acute pharyngitis  Secondary to infectious mononucleosis. Tolerates full liquids well.   Started on steroids which helps with swallowing. ENT consulted and so on the day of discharge. Infectious mononucleosis  Admitted because of no improvement over past 2 days. On antibiotics for potential secondary bacterial infections. Stable compared to yesterday. Elevated transaminases  Mild hepatitis, improving. Leukocytosis  WBC count higher today, most likely as a consequence of IV steroid administration. Continue to monitor. Discussed with the patient. Questions answered. DVT Prophylaxis: Lovenox  Diet: ADULT DIET; Full Liquid keep full liquid today. Consider advancing diet tomorrow  Code Status: Full Code  PT/OT Eval Status: Not indicated. Patient is already active and independent.     Dispo -will depend on ENT clearance      Jay Echevarria MD

## 2022-10-09 NOTE — PROGRESS NOTES
[facility]- HEAD & NECK SURGERY  Progress notes      Patient Name: Devon [address] Record Number:  [id number]  Primary Care Physician:  No primary care provider on file. Date of Consultation: 10/9/2022      Interval History  Significant improvement in pain. Improved breathing. Patient states that even before having mono he did not sleep well. He snores and has pauses in his breathing according to his significant other              PHYSICAL EXAM  NAD  Mild stertor, improved; no increased work of breathing  Very large tonsils      ASSESSMENT/PLAN  Severe pharyngitis from mono  -1 week clindamycin to cover possible secondary infection  -Prednisone taper  -f/u in 2 weeks to discuss possible tonsillectomy (patient has ARAMIS at baseline in setting of very large tonsils; sleep study vs up front adenotonsillectomy)  -ok for discharge, if it worsens return to ER. I have performed a head and neck physical exam personally or was physically present during the key or critical portions of the service. This note was generated completely or in part utilizing Dragon dictation speech recognition software. Occasionally, words are mistranscribed and despite editing, the text may contain inaccuracies due to incorrect word recognition. If further clarification is needed please contact the office at [phone].

## 2022-10-09 NOTE — PROGRESS NOTES
Pt. States he ate a popsicle and there may have been a piece of paper on it that he swallowed. Feels like it is stuck in the back of his throat. This nurse used tongue depresser to look in back of throat but no foreign object was seen. No s/s of resp. Distress. He states \" it is just annoying\". Call placed to ENT doctor and spoke to Dr. Finn Diggs to make aware. No new orders given.

## 2022-10-10 VITALS
RESPIRATION RATE: 20 BRPM | SYSTOLIC BLOOD PRESSURE: 132 MMHG | HEART RATE: 92 BPM | WEIGHT: 206.4 LBS | DIASTOLIC BLOOD PRESSURE: 87 MMHG | BODY MASS INDEX: 34.39 KG/M2 | OXYGEN SATURATION: 98 % | TEMPERATURE: 97.8 F | HEIGHT: 65 IN

## 2022-10-10 LAB
A/G RATIO: 0.8 (ref 1.1–2.2)
ALBUMIN SERPL-MCNC: 3.7 G/DL (ref 3.4–5)
ALP BLD-CCNC: 61 U/L (ref 40–129)
ALT SERPL-CCNC: 51 U/L (ref 10–40)
ANION GAP SERPL CALCULATED.3IONS-SCNC: 12 MMOL/L (ref 3–16)
AST SERPL-CCNC: 31 U/L (ref 15–37)
BASOPHILS ABSOLUTE: 0 K/UL (ref 0–0.2)
BASOPHILS RELATIVE PERCENT: 0.3 %
BILIRUB SERPL-MCNC: 0.5 MG/DL (ref 0–1)
BUN BLDV-MCNC: 10 MG/DL (ref 7–20)
CALCIUM SERPL-MCNC: 8.6 MG/DL (ref 8.3–10.6)
CHLORIDE BLD-SCNC: 102 MMOL/L (ref 99–110)
CO2: 23 MMOL/L (ref 21–32)
CREAT SERPL-MCNC: 0.8 MG/DL (ref 0.9–1.3)
EOSINOPHILS ABSOLUTE: 0 K/UL (ref 0–0.6)
EOSINOPHILS RELATIVE PERCENT: 0 %
GFR AFRICAN AMERICAN: >60
GFR NON-AFRICAN AMERICAN: >60
GLUCOSE BLD-MCNC: 131 MG/DL (ref 70–99)
HCT VFR BLD CALC: 38 % (ref 40.5–52.5)
HEMATOLOGY PATH CONSULT: NO
HEMATOLOGY PATH CONSULT: NORMAL
HEMOGLOBIN: 12.6 G/DL (ref 13.5–17.5)
LYMPHOCYTES ABSOLUTE: 5.6 K/UL (ref 1–5.1)
LYMPHOCYTES RELATIVE PERCENT: 33.6 %
MCH RBC QN AUTO: 28.3 PG (ref 26–34)
MCHC RBC AUTO-ENTMCNC: 33.2 G/DL (ref 31–36)
MCV RBC AUTO: 85.1 FL (ref 80–100)
MONOCYTES ABSOLUTE: 1.4 K/UL (ref 0–1.3)
MONOCYTES RELATIVE PERCENT: 8.3 %
NEUTROPHILS ABSOLUTE: 9.6 K/UL (ref 1.7–7.7)
NEUTROPHILS RELATIVE PERCENT: 57.8 %
PDW BLD-RTO: 13.9 % (ref 12.4–15.4)
PLATELET # BLD: 299 K/UL (ref 135–450)
PMV BLD AUTO: 8.4 FL (ref 5–10.5)
POTASSIUM SERPL-SCNC: 3.6 MMOL/L (ref 3.5–5.1)
RBC # BLD: 4.46 M/UL (ref 4.2–5.9)
SODIUM BLD-SCNC: 137 MMOL/L (ref 136–145)
TOTAL PROTEIN: 8.1 G/DL (ref 6.4–8.2)
WBC # BLD: 16.6 K/UL (ref 4–11)

## 2022-10-10 PROCEDURE — G0378 HOSPITAL OBSERVATION PER HR: HCPCS

## 2022-10-10 PROCEDURE — 6360000002 HC RX W HCPCS: Performed by: INTERNAL MEDICINE

## 2022-10-10 PROCEDURE — 96376 TX/PRO/DX INJ SAME DRUG ADON: CPT

## 2022-10-10 PROCEDURE — 6370000000 HC RX 637 (ALT 250 FOR IP): Performed by: INTERNAL MEDICINE

## 2022-10-10 PROCEDURE — 2580000003 HC RX 258: Performed by: INTERNAL MEDICINE

## 2022-10-10 PROCEDURE — 80053 COMPREHEN METABOLIC PANEL: CPT

## 2022-10-10 PROCEDURE — 2500000003 HC RX 250 WO HCPCS: Performed by: INTERNAL MEDICINE

## 2022-10-10 PROCEDURE — 85025 COMPLETE CBC W/AUTO DIFF WBC: CPT

## 2022-10-10 PROCEDURE — 96366 THER/PROPH/DIAG IV INF ADDON: CPT

## 2022-10-10 PROCEDURE — 36415 COLL VENOUS BLD VENIPUNCTURE: CPT

## 2022-10-10 RX ORDER — PREDNISONE 20 MG/1
TABLET ORAL
Qty: 11 TABLET | Refills: 0 | Status: SHIPPED | OUTPATIENT
Start: 2022-10-10

## 2022-10-10 RX ORDER — CLINDAMYCIN HYDROCHLORIDE 300 MG/1
300 CAPSULE ORAL 2 TIMES DAILY
Qty: 14 CAPSULE | Refills: 0 | Status: SHIPPED | OUTPATIENT
Start: 2022-10-10 | End: 2022-10-17

## 2022-10-10 RX ADMIN — ACETAMINOPHEN ORAL SOLUTION 650 MG: 650 SOLUTION ORAL at 10:29

## 2022-10-10 RX ADMIN — ACETAMINOPHEN ORAL SOLUTION 650 MG: 650 SOLUTION ORAL at 02:40

## 2022-10-10 RX ADMIN — CLINDAMYCIN PHOSPHATE 600 MG: 600 INJECTION, SOLUTION INTRAVENOUS at 10:15

## 2022-10-10 RX ADMIN — DEXAMETHASONE SODIUM PHOSPHATE 10 MG: 10 INJECTION, SOLUTION INTRAMUSCULAR; INTRAVENOUS at 05:32

## 2022-10-10 RX ADMIN — Medication 10 ML: at 10:26

## 2022-10-10 RX ADMIN — CLINDAMYCIN PHOSPHATE 600 MG: 600 INJECTION, SOLUTION INTRAVENOUS at 02:37

## 2022-10-10 ASSESSMENT — PAIN DESCRIPTION - DESCRIPTORS: DESCRIPTORS: ACHING

## 2022-10-10 ASSESSMENT — PAIN DESCRIPTION - LOCATION: LOCATION: HEAD

## 2022-10-10 ASSESSMENT — PAIN SCALES - GENERAL: PAINLEVEL_OUTOF10: 5

## 2022-10-10 NOTE — DISCHARGE SUMMARY
Hospital Medicine Discharge Summary    Patient: Kenroy Cano     Gender: male  : 2000   Age: 24 y.o. MRN: [id number]    Admitting Physician: No admitting provider for patient encounter. Discharge Physician: Hernan Brannon MD     Code Status: Full Code     Admit Date: 10/8/2022   Discharge Date:   10/10/2022    Disposition:  Home  Time spent arranging discharge: 35 minutes    Discharge Diagnoses: Active Hospital Problems    Diagnosis Date Noted    Acute pharyngitis due to infectious mononucleosis [B27.90] 10/08/2022     Priority: Medium       Condition at Discharge:  Stable    Hospital Course:   Was admitted to hospital with pharyngitis from mono seen by ENT cleared for discharge on prednisone taper and 1 week of clindamycin will follow up with ENT in 2 weeks    Discharge Exam:    /87   Pulse 92   Temp 97.8 °F (36.6 °C) (Oral)   Resp 20   Ht 5' 5\" (1.651 m)   Wt 206 lb 6.4 oz (93.6 kg)   SpO2 98%   BMI 34.35 kg/m²   General appearance:  Appears comfortable. AAOx3  HEENT: atraumatic, Pupils equal, muscous membranes moist, no masses appreciated  Cardiovascular: Regular rate and rhythm no murmurs appreciated  Respiratory: CTAB no wheezing  Gastrointestinal: Abdomen soft, non-tender, BS+  EXT: no edema  Neurology: no gross focal deficts  Psychiatry: Appropriate affect.  Not agitated  Skin: Warm, dry, no rashes appreciated    Discharge Medications:   Current Discharge Medication List        START taking these medications    Details   clindamycin (CLEOCIN) 300 MG capsule Take 1 capsule by mouth 2 times daily for 7 days  Qty: 14 capsule, Refills: 0      predniSONE (DELTASONE) 20 MG tablet 40mg x 3 days 20mg x 3 days 10mg x 3 days  Qty: 11 tablet, Refills: 0           Current Discharge Medication List        Current Discharge Medication List        CONTINUE these medications which have NOT CHANGED    Details   naproxen (NAPROSYN) 500 MG tablet Take 1 tablet by mouth 2 times daily (with HISTORY: ORDERING SYSTEM PROVIDED HISTORY: increased pharygeal edema since two days ago. now having trouble handling secretions FINDINGS: PHARYNX/LARYNX:  The palatine tonsils are enlarged and striated enhancement, similar to prior. Again noted is marked narrowing of the oropharyngeal airway. The tongue is normal in appearance. The valleculae, epiglottis, aryepiglottic folds and pyriform sinuses appear unremarkable. The true and false vocal cords are normal in appearance. No mass or abscess is seen. SALIVARY GLANDS/THYROID:  The parotid and submandibular glands appear unremarkable. The thyroid gland appears unremarkable. LYMPH NODES:  Mild diffuse cervical lymphadenopathy, likely reactive. No suppurative lymphadenitis. SOFT TISSUES:  No appreciable soft tissue swelling or mass is seen. BRAIN/ORBITS/SINUSES:  The visualized portion of the intracranial contents appear unremarkable. The visualized portion of the orbits, paranasal sinuses and mastoid air cells demonstrate no acute abnormality. LUNG APICES/SUPERIOR MEDIASTINUM:  No focal consolidation is seen within the visualized lung apices. No superior mediastinal lymphadenopathy or mass. The visualized portion of the trachea appears unremarkable. BONES:  No aggressive appearing lytic or blastic bony lesion. No significant change compared to CT neck from 10/06/2022. Severe pharyngitis/tonsillitis without discrete abscess. RECOMMENDATIONS: Unavailable     CT SOFT TISSUE NECK W CONTRAST    Result Date: 10/6/2022  EXAMINATION: CT OF THE NECK SOFT TISSUE WITH CONTRAST  10/6/2022 TECHNIQUE: CT of the neck was performed with the administration of intravenous contrast. Multiplanar reformatted images are provided for review. Automated exposure control, iterative reconstruction, and/or weight based adjustment of the mA/kV was utilized to reduce the radiation dose to as low as reasonably achievable. COMPARISON: None.  HISTORY: ORDERING SYSTEM PROVIDED HISTORY: r/o PTA FINDINGS: PHARYNX/LARYNX:  Enlarged tonsils with striated enhancement resulting in narrowing of the nasopharyngeal airway. The tongue is normal in appearance. The valleculae, epiglottis, aryepiglottic folds and pyriform sinuses pear unremarkable. The true and false vocal cords are normal in appearance. No mass or abscess is seen. SALIVARY GLANDS/THYROID:  The parotid and submandibular glands appear unremarkable. The thyroid gland appears unremarkable. LYMPH NODES:  Enlarged bilateral cervical lymph nodes. SOFT TISSUES:  No appreciable soft tissue swelling or mass is seen. BRAIN/ORBITS/SINUSES:  The visualized portion of the intracranial contents appear unremarkable. The visualized portion of the orbits, paranasal sinuses and mastoid air cells demonstrate no acute abnormality. LUNG APICES/SUPERIOR MEDIASTINUM:  No focal consolidation is seen within the visualized lung apices. No superior mediastinal lymphadenopathy or mass. The visualized portion of the trachea appears unremarkable. BONES:  No aggressive appearing lytic or blastic bony lesion. Tonsillitis resulting in narrowing of the nasopharyngeal airway. No evidence of abscess. Bilateral cervical lymphadenopathy, likely reactive.          Signed:    Daysi Castaneda MD   10/10/2022

## 2022-10-10 NOTE — PROGRESS NOTES
CLINICAL PHARMACY NOTE: MEDS TO BEDS    Total # of Prescriptions Filled: 2   The following medications were delivered to the patient:  Prednisone  clindamycin    Additional Documentation:  Patient picked up in [facility] pharmacy

## 2022-10-12 LAB
BLOOD CULTURE, ROUTINE: NORMAL
CULTURE, BLOOD 2: NORMAL